# Patient Record
Sex: FEMALE | Race: WHITE
[De-identification: names, ages, dates, MRNs, and addresses within clinical notes are randomized per-mention and may not be internally consistent; named-entity substitution may affect disease eponyms.]

---

## 2017-01-07 ENCOUNTER — HOSPITAL ENCOUNTER (EMERGENCY)
Dept: HOSPITAL 45 - C.EDB | Age: 27
Discharge: HOME | End: 2017-01-07
Payer: COMMERCIAL

## 2017-01-07 VITALS
HEIGHT: 62.99 IN | BODY MASS INDEX: 30 KG/M2 | BODY MASS INDEX: 30 KG/M2 | WEIGHT: 169.32 LBS | HEIGHT: 62.99 IN | WEIGHT: 169.32 LBS

## 2017-01-07 VITALS
TEMPERATURE: 98.6 F | HEART RATE: 110 BPM | SYSTOLIC BLOOD PRESSURE: 118 MMHG | OXYGEN SATURATION: 95 % | DIASTOLIC BLOOD PRESSURE: 70 MMHG

## 2017-01-07 DIAGNOSIS — F17.200: ICD-10-CM

## 2017-01-07 DIAGNOSIS — R22.0: Primary | ICD-10-CM

## 2017-01-07 DIAGNOSIS — Z90.89: ICD-10-CM

## 2017-01-07 DIAGNOSIS — J45.909: ICD-10-CM

## 2017-01-07 DIAGNOSIS — Z98.818: ICD-10-CM

## 2017-01-07 DIAGNOSIS — Z87.01: ICD-10-CM

## 2017-01-07 DIAGNOSIS — Z82.49: ICD-10-CM

## 2017-01-07 NOTE — EMERGENCY ROOM VISIT NOTE
History


First contact with patient:  18:48


Chief Complaint:  FACIAL PAIN/INJURY


Stated Complaint:  SWOLLEN LIP





History of Present Illness


The patient is a 26 year old female who presents to the Emergency Room with 

complaints of swelling of the right side of her lower lip.  The patient reports 

that she has had a lip piercing in that area for several years.  She states 

that she is not able to wear the piercing at work, so she keeps it out but 

occasionally puts the jewelry in and then removes it to keep the piercing open.

  She states that she did this today with the same jewelry she has been using 

for several years.  She reports that a few hours afterward, she noticed 

swelling in that side of the lip.  She states that she has not had a reaction 

to the jewelry in the past.  She denies any redness or warmth of the lip.  She 

does report that she had one other episode of lip swelling several years ago.  

She reports that at that time, her friend's dog had licked her face and she 

noticed swelling in both of the lips several hours later.  She states that that 

swelling was in both of her lips and decreased over several hours.  She denies 

any swelling of her tongue, difficulty swallowing or difficulty breathing.  She 

denies any other new exposures.  She denies any pain.  The patient does not 

take any medications daily.





Review of Systems


A complete 10-point Review of Systems was discussed with the patient, with 

pertinent positives and negatives listed in the History of Present Illness. All 

remaining Review of Systems questions can be considered negative unless 

otherwise specified.





Past Medical/Surgical History


Medical Problems:


(1) Asthma


(2) PNA, asthma exac, tachy cardia


Surgical Problems:


(1) Hx of tonsillectomy


(2) [place] teeth extracted








Family History





FH: pulmonary embolism





Social History


Smoking Status:  Current Every Day Smoker


Alcohol Use:  occasionally


Drug Use:  none


Marital Status:  in relationship


Housing Status:  lives with friends


Occupation Status:  employed





Current/Historical Medications


Scheduled


Amoxicillin & Pot Clavulanate (Augmentin 875-125 mg), 1 TAB PO BID





Scheduled PRN


Albuterol Inhaler (Ventolin Inhaler), 2 PUFFS INH QID PRN for Wheezing


Albuterol Soln (Proventil 0.083% 2.5MG/3ML), 2.5 MG INH QID PRN for Wheezing





Allergies


Coded Allergies:  


     No Known Allergies (Verified , 1/7/17)





Physical Exam


Vital Signs











  Date Time  Temp Pulse Resp B/P Pulse Ox O2 Delivery O2 Flow Rate FiO2


 


1/7/17 20:43 37.0 110 18 118/70 95   


 


1/7/17 18:32 37.0 110 18 118/70 95 Room Air  











Physical Exam


VITALS: Vitals are noted on the nurse's note and reviewed by myself.  Vital 

signs stable.


GENERAL: This is a 26-year-old female, in no acute distress, nondiaphoretic, 

well-developed well-nourished.


HEAD: Normocephalic atraumatic.  


EARS: External auditory canals clear, tympanic membranes pearly gray without 

erythema or effusion bilaterally.


EYES: Pupils equal round and reactive to light and accommodation.  No 

periorbital swelling.


MOUTH: There is moderate edema of the right lower lip.  There is no erythema or 

warmth.  Mucous membranes moist.  Uvula midline.  Airway patent.


NECK: Supple without nuchal rigidity.  No lymphadenopathy. 


HEART: Regular rate and rhythm without murmurs gallops or rubs.


LUNGS: Clear to auscultation bilaterally without wheezes, rales or rhonchi. 


NEURO: Patient was alert and oriented to person place and time.





Medical Decision & Procedures


Medications Administered











 Medications


  (Trade)  Dose


 Ordered  Sig/Yanet


 Route  Start Time


 Stop Time Status Last Admin


Dose Admin


 


 Diphenhydramine


 HCl


  (Benadryl Inj)  25 mg  NOW  STAT


 IV  1/7/17 19:08


 1/7/17 19:09 DC 1/7/17 20:02


25 MG


 


 Dexamethasone


 Sodium Phosphate


 10 mg  10 mg  NOW  STAT


 IV  1/7/17 19:08


 1/7/17 19:09 DC 1/7/17 20:02


10 MG


 


 Famotidine/


 Dextrose


  (Pepcid IV Inj/


 D5 100ml)  102 ml @ 


 200 mls/hr  NOW  STAT


 IV  1/7/17 19:08


 1/7/17 19:38 DC 1/7/17 20:02


200 MLS/HR











Medical Decision


Differential diagnosis includes infection, allergic reaction, angioedema, among 

others.





The patient was evaluated as above.  The swelling does not appear to be 

consistent with infection.  It may be secondary to an allergic reaction, but 

the patient reports that she has been using the story for several years without 

difficulty.  I do question whether the patient could have angioedema, as she 

has had an episode of lip swelling in the past.  The patient does not have any 

airway involvement.  There is no swelling of the tongue.  She was given IV 

Benadryl, Pepcid and Decadron and did have a small amount of improvement in his 

symptoms.  She was encouraged to continue to use Benadryl at home.  I did give 

the patient a prescription for Augmentin to take in 2-3 days if her symptoms 

persist or if she develops any other findings suggestive of infection, such as 

redness or drainage.  She'll follow-up with her primary care provider.  She 

will return for worsening symptoms.  She verbalized understanding of my 

assessment and treatment plan and was discharged home in good condition.





Impression





 Primary Impression:  Lip swelling





Departure Information


Dispostion


Home / Self-Care





Condition


GOOD





Prescriptions





Amoxicillin & Pot Clavulanate (Augmentin 875-125 mg) 1 Tab Tab


1 TAB PO BID for 7 Days, #14 TAB


   Prov: Ni Wilder ., HERNESTO         1/7/17





Referrals


No Doctor, Assigned (PCP)





Patient Instructions


A Signature Page, My [facility]





Additional Instructions





You were prescribed Augmentin to be taken twice daily for 1 week. This is an 

antibiotic. All antibiotics have the potential to cause diarrhea. Stop this 

medication and contact a medical provider if you were to develop any 

significant adverse side effects including: wheezing, shortness of breath, 

passing out, vomiting, or a diffuse rash. Always take antibiotics as directed 

and COMPLETE the ENTIRE course regardless of the improvement of your symptoms.  

Take this antibiotic if you develop redness or warmth of the lip or if the 

swelling persists into the day tomorrow.





 You should take Benadryl (diphenhydramine) 50 mgs every 6 hours until symptoms 

resolve.  





Follow-up with your primary care provider regarding your lip swelling.





Return to the emergency department if you develop worsening swelling or any 

difficulty breathing or swallowing.

## 2017-02-16 ENCOUNTER — HOSPITAL ENCOUNTER (EMERGENCY)
Dept: HOSPITAL 45 - C.EDB | Age: 27
Discharge: HOME | End: 2017-02-16
Payer: COMMERCIAL

## 2017-02-16 VITALS
DIASTOLIC BLOOD PRESSURE: 59 MMHG | SYSTOLIC BLOOD PRESSURE: 113 MMHG | OXYGEN SATURATION: 96 % | TEMPERATURE: 99.68 F | HEART RATE: 96 BPM

## 2017-02-16 VITALS
WEIGHT: 160.5 LBS | WEIGHT: 160.5 LBS | HEIGHT: 62.99 IN | BODY MASS INDEX: 28.44 KG/M2 | HEIGHT: 62.99 IN | BODY MASS INDEX: 28.44 KG/M2

## 2017-02-16 DIAGNOSIS — J02.9: Primary | ICD-10-CM

## 2017-02-16 DIAGNOSIS — J45.909: ICD-10-CM

## 2017-02-16 LAB
ANION GAP SERPL CALC-SCNC: 11 MMOL/L (ref 3–11)
BASOPHILS # BLD: 0.03 K/UL (ref 0–0.2)
BASOPHILS NFR BLD: 0.3 %
BUN SERPL-MCNC: 12 MG/DL (ref 7–18)
BUN/CREAT SERPL: 12.9 (ref 10–20)
CALCIUM SERPL-MCNC: 8.5 MG/DL (ref 8.5–10.1)
CHLORIDE SERPL-SCNC: 110 MMOL/L (ref 98–107)
CO2 SERPL-SCNC: 21 MMOL/L (ref 21–32)
COMPLETE: YES
CREAT CL PREDICTED SERPL C-G-VRATE: 88.4 ML/MIN
CREAT SERPL-MCNC: 0.93 MG/DL (ref 0.6–1.2)
EOSINOPHIL NFR BLD AUTO: 169 K/UL (ref 130–400)
GLUCOSE SERPL-MCNC: 85 MG/DL (ref 70–99)
HCT VFR BLD CALC: 41.1 % (ref 37–47)
IG%: 0.2 %
IMM GRANULOCYTES NFR BLD AUTO: 22.9 %
LYMPHOCYTES # BLD: 2.5 K/UL (ref 1.2–3.4)
MCH RBC QN AUTO: 33.3 PG (ref 25–34)
MCHC RBC AUTO-ENTMCNC: 35.5 G/DL (ref 32–36)
MCV RBC AUTO: 93.8 FL (ref 80–100)
MONOCYTES NFR BLD: 8.3 %
NEUTROPHILS # BLD AUTO: 1.9 %
NEUTROPHILS NFR BLD AUTO: 66.4 %
PMV BLD AUTO: 10.4 FL (ref 7.4–10.4)
POTASSIUM SERPL-SCNC: 3.5 MMOL/L (ref 3.5–5.1)
RBC # BLD AUTO: 4.38 M/UL (ref 4.2–5.4)
SODIUM SERPL-SCNC: 142 MMOL/L (ref 136–145)
WBC # BLD AUTO: 10.92 K/UL (ref 4.8–10.8)

## 2017-02-16 NOTE — EMERGENCY ROOM VISIT NOTE
History


Report prepared by Vasu:  Farzaneh Begum


Under the Supervision of:  Dr. Ji Kat M.D.


First contact with patient:  21:27


Chief Complaint:  ILLNESS


Stated Complaint:  THROAT AND R SIDE OF NECK HURT, TROUBLE SWALLOWING





History of Present Illness


The patient is a 26 year old female who presents to the Emergency Room with 

complaints of worsening right sided throat pain starting earlier today PTA. The 

patient currently rates her pain as a 9/10 in severity. The patient state 

states that she is unable to swallow very well due to the throat pain. She 

states that she also has neck pain and ear pain associated with her symptoms. 

The patient states that she has also had a fever up to 102 degrees Fahrenheit 

and chills today. The patient denies any sick contacts, any urinary symptoms, 

abdominal pain or problems having bowel movements.The patient states that she 

has not taken any medication for her symptoms today.





   Source of History:  patient


   Onset:  earlier today PTA


   Position:  throat (right)


   Symptom Intensity:  9/10


   Timing:  worsening


   Associated Symptoms:  + chills, + fevers (102), No abdominal pain, No 

urinary symptoms


Note:


Patient denies changes in bowel movements.





Review of Systems


All systems have been listed, reviewed, and are negative other than those 

previously mentioned. Please see Additional Medical History Sheet.





Past Medical & Surgical


Medical Problems:


(1) Asthma


(2) PNA, asthma exac, tachy cardia


Surgical Problems:


(1) Hx of tonsillectomy


(2) [place] teeth extracted








Family History





FH: pulmonary embolism





Social History


Smoking Status:  Never Smoker


Alcohol Use:  occasionally


Drug Use:  none


Marital Status:  in relationship


Housing Status:  lives with friends


Occupation Status:  employed





Current/Historical Medications


Scheduled PRN


Albuterol Inhaler (Ventolin Inhaler), 2 PUFFS INH QID PRN for Wheezing


Albuterol Soln (Proventil 0.083% 2.5MG/3ML), 2.5 MG INH QID PRN for Wheezing





Allergies


Coded Allergies:  


     No Known Allergies (Verified , 1/7/17)





Physical Exam


Vital Signs











  Date Time  Temp Pulse Resp B/P Pulse Ox O2 Delivery O2 Flow Rate FiO2


 


2/16/17 22:57 37.6 96 16 113/59 96 Room Air  


 


2/16/17 20:48 37.7 102 18 120/70 98 Room Air  











Physical Exam


GENERAL: Patient awake, alert, oriented x 3.  Patient follows commands.  

Patient appears uncomfortable in moderate distress and pale. 


SKIN: No erythema, cyanosis or rash


HEENT: Normal head, pupils equal, reactive to light and accommodation.  Ears 

normal.  Oral cavity and slight erythema of the posterior oropharynx, no pus, 

no tonsillar hypertrophy.  Neck: right sided submandibular adenopathy, no neck 

vein distention, no meningismus signs. 


LUNGS: Clear to auscultation.  No wheezes, no rales, no rhonchi.


HEART:  No murmurs. No gallops. No rubs


ABDOMEN: No masses, no rebound, no hepatomegaly or splenomegaly.


EXTREMITIES: No signs of trauma.  No pedal or pretibial edema.  No calf or 

thigh tenderness.


NEUROLOGIC: Cranial nerves II-XII within normal limits.  No gross motor sensory 

function deficits.





Medical Decision & Procedures


Laboratory Results


2/16/17 21:40








Red Blood Count 4.38, Mean Corpuscular Volume 93.8, Mean Corpuscular Hemoglobin 

33.3, Mean Corpuscular Hemoglobin Concent 35.5, Mean Platelet Volume 10.4, 

Neutrophils (%) (Auto) 66.4, Lymphocytes (%) (Auto) 22.9, Monocytes (%) (Auto) 

8.3, Eosinophils (%) (Auto) 1.9, Basophils (%) (Auto) 0.3, Neutrophils # (Auto) 

7.25, Lymphocytes # (Auto) 2.50, Monocytes # (Auto) 0.91, Eosinophils # (Auto) 

0.21, Basophils # (Auto) 0.03





2/16/17 21:40

















Test


  2/16/17


21:35 2/16/17


21:40


 


Influenza Type A Antigen


  Neg for Influ


A (NEG) 


 


 


Influenza Type B Antigen


  Neg for Influ


B (NEG) 


 


 


White Blood Count


  


  10.92 K/uL


(4.8-10.8)


 


Red Blood Count


  


  4.38 M/uL


(4.2-5.4)


 


Hemoglobin


  


  14.6 g/dL


(12.0-16.0)


 


Hematocrit  41.1 % (37-47) 


 


Mean Corpuscular Volume


  


  93.8 fL


()


 


Mean Corpuscular Hemoglobin


  


  33.3 pg


(25-34)


 


Mean Corpuscular Hemoglobin


Concent 


  35.5 g/dl


(32-36)


 


Platelet Count


  


  169 K/uL


(130-400)


 


Mean Platelet Volume


  


  10.4 fL


(7.4-10.4)


 


Neutrophils (%) (Auto)  66.4 % 


 


Lymphocytes (%) (Auto)  22.9 % 


 


Monocytes (%) (Auto)  8.3 % 


 


Eosinophils (%) (Auto)  1.9 % 


 


Basophils (%) (Auto)  0.3 % 


 


Neutrophils # (Auto)


  


  7.25 K/uL


(1.4-6.5)


 


Lymphocytes # (Auto)


  


  2.50 K/uL


(1.2-3.4)


 


Monocytes # (Auto)


  


  0.91 K/uL


(0.11-0.59)


 


Eosinophils # (Auto)


  


  0.21 K/uL


(0-0.5)


 


Basophils # (Auto)


  


  0.03 K/uL


(0-0.2)


 


RDW Standard Deviation


  


  44.5 fL


(36.4-46.3)


 


RDW Coefficient of Variation


  


  12.8 %


(11.5-14.5)


 


Immature Granulocyte % (Auto)  0.2 % 


 


Immature Granulocyte # (Auto)


  


  0.02 K/uL


(0.00-0.02)


 


Anion Gap


  


  11.0 mmol/L


(3-11)


 


Est Creatinine Clear Calc


Drug Dose 


  88.4 ml/min 


 


 


Estimated GFR (


American) 


  99.0 


 


 


Estimated GFR (Non-


American 


  85.4 


 


 


BUN/Creatinine Ratio  12.9 (10-20) 


 


Calcium Level


  


  8.5 mg/dl


(8.5-10.1)





Laboratory results as stated above per my review.





Medications Administered











 Medications


  (Trade)  Dose


 Ordered  Sig/Yanet


 Route  Start Time


 Stop Time Status Last Admin


Dose Admin


 


 Acetaminophen


 1000 mg  1,000 mg  NOW  STAT


 PO  2/16/17 21:28


 2/16/17 21:34 DC 2/16/17 21:45


1,000 MG


 


 Sodium Chloride


  (Nss 1000ml)  1,000 ml @ 


 1,000 mls/hr  Q1H ONCE


 IV  2/16/17 21:30


 2/16/17 22:29 DC 2/16/17 21:46


1,000 MLS/HR


 


 Lidocaine HCl


  (Viscous


 Lidocaine 2% Soln)  20 ml  NOW  ONCE


 MT  2/16/17 21:30


 2/16/17 21:34 DC 2/16/17 21:44


20 ML











ED Course


2127: Past medical records reviewed. The patient was evaluated in room C10. A 

complete history and physical examination was performed. 





2128: Ordered Tylenol Tab 1,000 mg PO.





2130: Ordered  Lidocaine HCl 20 ml MT, Sodium Chloride 1,000 ml @ 1,000 mls/hr 

IV.





2305: Upon reevaluation, the patient appeared to have improvement of her 

symptoms. I discussed today's findings with her. She verbalized agreement of 

the treatment plan. The patient was discharged home.





Medical Decision


Nurses notes reviewed. Medical history sheet reviewed. Differential diagnosis 

includes but is not limited to: influenza, pharyngitis viral vs bacteria, other 

viral infections, and dehydration. 





Labs were obtained.  Please see above.  Influenza testing was negative.  White 

count is minimally elevated.  Strep test was negative.  The patient was given a 

liter of IV fluids and felt significantly better.  She will continue taking 

Tylenol at home.  She is to gargle with salt water.  She is to drink extra 

fluids.





Impression





 Primary Impression:  


 Acute pharyngitis





Scribe Attestation


The scribe's documentation has been prepared under my direction and personally 

reviewed by me in its entirety. I confirm that the note above accurately 

reflects all work, treatment, procedures, and medical decision making performed 

by me.





Departure Information


Dispostion


Home / Self-Care





Referrals


No Doctor, Assigned (PCP)





Forms


HOME CARE DOCUMENTATION FORM,                                                 

               IMPORTANT VISIT INFORMATION, WORK / SCHOOL INSTRUCTIONS





Patient Instructions


My [facility], Sore Throat - [facility]





Additional Instructions





Drink 3-4 quarts of liquid of the next 24 hours.


650 mg of Tylenol every 4 hours as needed for aches, pain or fever.


Gargle with hot salt water   1 tablespoon of salt in a half a glass of hot 

water.


Off work until 2/20/17.


REST

## 2017-07-22 ENCOUNTER — HOSPITAL ENCOUNTER (EMERGENCY)
Dept: HOSPITAL 45 - C.EDB | Age: 27
Discharge: HOME | End: 2017-07-22
Payer: COMMERCIAL

## 2017-07-22 VITALS — SYSTOLIC BLOOD PRESSURE: 113 MMHG | DIASTOLIC BLOOD PRESSURE: 72 MMHG | OXYGEN SATURATION: 96 % | HEART RATE: 88 BPM

## 2017-07-22 VITALS
WEIGHT: 161.16 LBS | BODY MASS INDEX: 28.55 KG/M2 | BODY MASS INDEX: 28.55 KG/M2 | WEIGHT: 161.16 LBS | HEIGHT: 62.99 IN | HEIGHT: 62.99 IN

## 2017-07-22 VITALS — TEMPERATURE: 97.88 F

## 2017-07-22 DIAGNOSIS — F17.200: ICD-10-CM

## 2017-07-22 DIAGNOSIS — J45.909: ICD-10-CM

## 2017-07-22 DIAGNOSIS — J18.9: Primary | ICD-10-CM

## 2017-07-22 NOTE — EMERGENCY ROOM VISIT NOTE
History


First contact with patient:  11:40


Chief Complaint:  CONGESTION


Stated Complaint:  CHEST LULY., STUFFY NOSE


Nursing Triage Summary:  


Pt c/o chest and nasal congestion x4 days





unable to cough anything up





History of Present Illness


The patient is a 26 year old female who presents to the Emergency Room via 

private vehicle with complaints of "chest and nasal congestion 4 days".  The 

patient states that beginning 4 days ago, she felt as though she had mucus in 

her chest, and is tried Mucinex but is unable to cough or break any of it up.  

She also notes a runny nose.  She states that there are small little crystals 

in her saliva.  She denies any fevers, chills.  She does have history of 

smoking.  She denies any hormone use.  She denies any history of PE.





Review of Systems


A complete 6-point Review of Systems was discussed with the patient, with 

pertinent positives and negatives listed in the History of Present Illness. All 

remaining Review of Systems questions can be considered negative unless 

otherwise specified.





Past Medical/Surgical History


Medical Problems:


(1) Asthma


(2) PNA, asthma exac, tachy cardia


Surgical Problems:


(1) Hx of tonsillectomy


(2) [place] teeth extracted








Family History





FH: pulmonary embolism





Social History


Smoking Status:  Current Every Day Smoker


Alcohol Use:  occasionally


Drug Use:  none


Marital Status:  in relationship


Housing Status:  lives with friends


Occupation Status:  employed





Current/Historical Medications


Scheduled


Albuterol Hfa (Ventolin Hfa), 2-4 PUFFS INH Q6H


Levofloxacin (Levaquin), 1 TAB PO DAILY





Scheduled PRN


Albuterol Sulf (Proventil 0.083% 2.5MG/3ML), 2.5 MG INH QID PRN for Wheezing





Physical Exam


Vital Signs











  Date Time  Temp Pulse Resp B/P (MAP) Pulse Ox O2 Delivery O2 Flow Rate FiO2


 


7/22/17 13:04  88  113/72 96 Room Air  


 


7/22/17 11:57      Room Air  


 


7/22/17 11:37 36.6 103 18 142/55 95 Room Air  











Physical Exam


VITAL SIGNS - Vital signs and nursing notes were reviewed.  Patient is afebrile

, hypertensive at 142/55, tachycardic rate of 103 bpm, and is saturating on 

room air 95%.


GENERAL -26-year-old female appearing her stated age who is in no acute 

distress. Communicates well with provider and answers questions appropriately.


SKIN - Without rashes.  No petechial rashes.


HEAD - NC/AT.


EYES - PERRL with EOMI bilaterally. Sclera anicteric. 


EARS - No deformities of external structures noted on gross examination 

bilaterally. No pain elicited with palpation of the tragus bilaterally. 

External auditory canals without discharge or otorrhea. Tympanic membranes 

pearly gray without retraction or bulging. No fluid or purulent material 

visualized behind the TM. Handle of malleus, umbo, cone of light, pars tensa/

flaccid all easily visualized.


NOSE - Midline and without cyanosis. No epistaxis or purulent drainage noted. 

Septum midline without deviation or septal hematoma noted.


MOUTH/OROPHARYNX - Without perioral cyanosis. Buccal mucosa pink and moist and 

without leukoplakia. Tongue midline with equal elevation of palate bilaterally. 

No tonsillar hypertrophy, erythema, or exudates noted.  Fair dentition noted.  

Small crystallization noted in the region where her tonsils removed.


NECK - Neck with FROM. Supple to palpation.  No lymphadenopathy noted. No 

nuchal rigidity.


LUNGS - Chest wall symmetric without accessory muscle use, intercostals 

retractions, or central cyanosis.  There is diffuse bilateral wheezing.


CARDIAC - RRR with S1/S2. No murmur, rubs, or gallops appreciated.





Medical Decision & Procedures


ER Provider


Diagnostic Interpretation:


CHEST 2 VIEWS ROUTINE





CLINICAL HISTORY: Cough, chest congestion.    





COMPARISON STUDY:  Chest radiograph November 4, 2016.





FINDINGS: There is no pneumothorax or pleural effusion. There is mild lower lung


interstitial thickening there may be lingular and right middle lobe opacities.


Cardiac size is normal. Mediastinal contours are normal. There is no evidence


for pulmonary edema. 





IMPRESSION: Mild lower lung interstitial thickening with patchy airspace


opacities which favors an infectious process.














Electronically signed by:  Samson rCuz M.D.


7/22/2017 12:30 PM





Dictated Date/Time:  7/22/2017 12:29 PM





Medications Administered











 Medications


  (Trade)  Dose


 Ordered  Sig/Yanet


 Route  Start Time


 Stop Time Status Last Admin


Dose Admin


 


 Albuterol/


 Ipratropium


  (Duoneb)  3 ml  NOW  STAT


 INH  7/22/17 11:51


 7/22/17 11:53 DC 7/22/17 12:16


3 ML


 


 Levofloxacin


  (Levaquin Tab)  750 mg  STK-MED ONCE


 .ROUTE  7/22/17 13:01


 7/22/17 13:02 DC 7/22/17 13:06


750 MG











Medical Decision


Patient was seen and evaluated as above.  After obtaining a thorough history 

and physical examination chest x-ray was obtained as well as a breathing 

treatment via DuoNeb.  Patient denied chance of pregnancy, and noted that she 

was currently menstruating.  She is nontoxic in appearance, and has bilateral 

wheezing to auscultation.  She was reevaluated post-breathing treatment and was 

not experiencing any relief.  She is able to breathe well on her own, she just 

has some wheezing.  X-rays concerning for that of an infectious process, which 

I believe to be pneumonia.  Given that she is afebrile, and has no complaints 

of fevers, chills I believe that outpatient management for pneumonia may be 

performed.  Initially I was going to start her on azithromycin, however she 

notes that this does not work for her.  Levaquin will be initiated.  750 mg 7 

days.  First dose was given here today over concern that she could not  

her prescription today at the pharmacy.  I discussed with her that although 

less likely, pulmonary embolism is a differential diagnosis.  She is tachycardic

, but is saturating well on room air.  She does smoke, but has no history of 

pulmonary embolism, and does not have any concerning pleuritic pain.  The 

patient is experiencing congestion, as well as a runny nose, and with these 

occurring concurrently I believe infectious etiology is favored over embolism.  

The patient is to follow up regarding today's visit, and certainly is to return 

if worsening.  She was educated upon worrisome symptoms which to return, had 

questions prior to discharge, and was discharged home in good condition.





In evaluation treatment this patient following differential diagnoses were 

entertained: Pneumonia, bronchitis, pulmonary embolism, among others.





Impression





 Primary Impression:  


 Pneumonia





Departure Information


Dispostion


Home / Self-Care





Condition


GOOD





Prescriptions





Levofloxacin (LEVAQUIN) 750 Mg Tab


1 TAB PO DAILY for 6 Days, #6 TAB


   Prov: Manoj Ordaz PA-C         7/22/17





Referrals


Lakeisha Terry C.R.NBAMBI (PCP)





Patient Instructions


My [facility]





Additional Instructions





You were seen in the emergency department for chest congestion, stuffy nose.





Continuing and Mucinex as indicated on the package is recommended.





You have been prescribed Levaquin, this is 750 mg daily for the next 7 days.  

You have been given your first dose here with the remainder the prescription 

for the remaining 6 days.  You may begin this tomorrow.





Please use Tylenol and ibuprofen for any complaints of pain.





Please return with any worsening of your symptoms, fevers, chills or any 

concerns.





Thank you for your time.

## 2017-07-22 NOTE — DIAGNOSTIC IMAGING REPORT
CHEST 2 VIEWS ROUTINE



CLINICAL HISTORY: Cough, chest congestion.    



COMPARISON STUDY:  Chest radiograph November 4, 2016.



FINDINGS: There is no pneumothorax or pleural effusion. There is mild lower lung

interstitial thickening there may be lingular and right middle lobe opacities.

Cardiac size is normal. Mediastinal contours are normal. There is no evidence

for pulmonary edema. 



IMPRESSION: Mild lower lung interstitial thickening with patchy airspace

opacities which favors an infectious process.









Electronically signed by:  Samson Cruz M.D.

7/22/2017 12:30 PM



Dictated Date/Time:  7/22/2017 12:29 PM

## 2017-07-25 ENCOUNTER — HOSPITAL ENCOUNTER (OUTPATIENT)
Dept: HOSPITAL 45 - C.LABSPEC | Age: 27
Discharge: HOME | End: 2017-07-25
Attending: OBSTETRICS & GYNECOLOGY
Payer: COMMERCIAL

## 2017-07-25 ENCOUNTER — HOSPITAL ENCOUNTER (OUTPATIENT)
Dept: HOSPITAL 45 - C.PAPS | Age: 27
End: 2017-07-25
Attending: OBSTETRICS & GYNECOLOGY
Payer: COMMERCIAL

## 2017-07-25 DIAGNOSIS — Z01.419: Primary | ICD-10-CM

## 2017-07-25 DIAGNOSIS — Z11.51: ICD-10-CM

## 2017-07-28 LAB
CHLAMYDIA TRACH RNA***: NOT DETECTED
GC (NEIS GONORRHOEAE)RNA**: NOT DETECTED

## 2017-10-14 ENCOUNTER — HOSPITAL ENCOUNTER (EMERGENCY)
Dept: HOSPITAL 45 - C.EDB | Age: 27
Discharge: HOME | End: 2017-10-14
Payer: COMMERCIAL

## 2017-10-14 VITALS
HEIGHT: 62.99 IN | WEIGHT: 169.54 LBS | WEIGHT: 169.54 LBS | BODY MASS INDEX: 30.04 KG/M2 | HEIGHT: 62.99 IN | BODY MASS INDEX: 30.04 KG/M2

## 2017-10-14 VITALS
TEMPERATURE: 99.14 F | OXYGEN SATURATION: 96 % | DIASTOLIC BLOOD PRESSURE: 73 MMHG | SYSTOLIC BLOOD PRESSURE: 133 MMHG | HEART RATE: 92 BPM

## 2017-10-14 DIAGNOSIS — Z98.818: ICD-10-CM

## 2017-10-14 DIAGNOSIS — F17.200: ICD-10-CM

## 2017-10-14 DIAGNOSIS — Z90.89: ICD-10-CM

## 2017-10-14 DIAGNOSIS — Z87.01: ICD-10-CM

## 2017-10-14 DIAGNOSIS — Z82.49: ICD-10-CM

## 2017-10-14 DIAGNOSIS — J45.901: Primary | ICD-10-CM

## 2017-10-14 NOTE — DIAGNOSTIC IMAGING REPORT
SINGLE VIEW CHEST



CLINICAL HISTORY:  Atypical chest pain.



FINDINGS: An AP, portable, upright chest radiograph is compared to study dated

7/22/2017 and correlated with chest CT dated 1/15/2016. The cardiomediastinal

silhouette is unremarkable. The lungs and pleural spaces are clear. No

pneumothorax is seen. The bony thorax is grossly intact.



IMPRESSION: No active disease in the chest.







Electronically signed by:  Mani Mcfadden M.D.

10/14/2017 7:24 PM



Dictated Date/Time:  10/14/2017 7:23 PM

## 2017-10-14 NOTE — EMERGENCY ROOM VISIT NOTE
History


Report prepared by Vasu:  Florence Bain


Under the Supervision of:  Dr. Tom Bah M.D.


First contact with patient:  18:57


Chief Complaint:  COUGH


Stated Complaint:  CANT BREATH,CHEST PAIN





History of Present Illness


The patient is a 27 year old female who presents to the Emergency Room with 

complaints of worsening cough starting 2-3 days ago. Her cough worsened last 

night. She feels unable to breathe and has chest pain with breathing and 

coughing. She has been using her nebulizer at home to no significant relief. 

Her cough is not productive. She feels wheezy. She denies any fever, leg pain, 

leg swelling, sore throat, rash, abdominal pain, post nasal drip, nausea, 

vomiting, diarrhea, or ear ache. She states her symptoms feel like asthma and 

bronchitis. She has a history of asthma, bronchitis, and pneumonia. She uses a 

Ventolin inhaler as needed. She is currently not on prednisone, but has had it 

in the past. She denies any chance of pregnancy. She works in a  and she 

has had sick contacts at work.





   Source of History:  patient


   Onset:  2-3 days ago


   Position:  other (global)


   Quality:  other (cough)


   Timing:  worsening


   Associated Symptoms:  + chest pain, + SOB, No fevers, No sorethroat, No 

nausea, No vomiting, No abdominal pain, No diarrhea, No rash


Note:


Pt denies leg pain/swelling, post nasal drip, ear ache.





Review of Systems


See HPI for pertinent positives & negatives. A total of 10 systems reviewed and 

were otherwise negative.





Past Medical & Surgical


Medical Problems:


(1) Asthma


(2) PNA, asthma exac, tachy cardia


Surgical Problems:


(1) Hx of tonsillectomy


(2) [place] teeth extracted





Old medical records were reviewed. Nurse's notes were reviewed and I agree with.





Family History





FH: pulmonary embolism





Social History


Smoking Status:  Current Every Day Smoker


Alcohol Use:  occasionally


Drug Use:  none


Marital Status:  in relationship


Housing Status:  lives with friends


Occupation Status:  employed





Current/Historical Medications


Scheduled


Albuterol Hfa (Ventolin Hfa), 2-4 PUFFS INH Q6H


Amoxicillin & Pot Clavulanate (Augmentin 875-125 mg), 875 MG PO BID


Prednisone (Prednisone), 50 MG PO DAILY





Scheduled PRN


Albuterol Sulf (Proventil 0.083% 2.5MG/3ML), 2.5 MG INH QID PRN for Wheezing





Allergies


Coded Allergies:  


     No Known Allergies (Verified , 10/14/17)





Physical Exam


Vital Signs











  Date Time  Temp Pulse Resp B/P (MAP) Pulse Ox O2 Delivery O2 Flow Rate FiO2


 


10/14/17 20:25 37.3 92  133/73 96   


 


10/14/17 19:13      Room Air  


 


10/14/17 18:54 37.5 92 19 111/64 97 Room Air  











Physical Exam


General: Non-ill appearing young female in no acute distress. 


HEENT: Normal cephalic atraumatic.  Pupils are equal round and reactive to 

light.  Extraocular movements are intact. Normal tympanic membranes. Oropharynx 

is pink with moist mucous membranes.  No swelling of the mouth lips or tongue.


Neck: Supple with a midline trachea.  No meningeal signs or stiffness, no JVD 

or bruits. No Stridor.


Chest: Scattered wheezes, normal respiratory effort, deep hacking cough when 

she takes a deep breath. 


Heart: regular rate and rhythm. 


Abdomen: Soft nontender, nondistended without rebound guarding or rigidity.  


Extremities: No cyanosis clubbing or edema. No calf tenderness or assymetry


Spine/Back. Non tender to palpation. No CVA tenderness


Skin: Good turgor without rashes.


Neurologic exam: Cranial nerves two through 12 are intact.  Motor and sensation 

are intact and symmetrical throughout.





Medical Decision & Procedures


ER Provider


Diagnostic Interpretation:


X-ray results as stated below per interpretation by me and the radiologist: 





SINGLE VIEW CHEST





CLINICAL HISTORY:  Atypical chest pain.





FINDINGS: An AP, portable, upright chest radiograph is compared to study dated


7/22/2017 and correlated with chest CT dated 1/15/2016. The cardiomediastinal


silhouette is unremarkable. The lungs and pleural spaces are clear. No


pneumothorax is seen. The bony thorax is grossly intact.





IMPRESSION: No active disease in the chest.





Electronically signed by:  Mani Mcfadden M.D.


10/14/2017 7:24 PM





Dictated Date/Time:  10/14/2017 7:23 PM





Medications Administered











 Medications


  (Trade)  Dose


 Ordered  Sig/Yanet


 Route  Start Time


 Stop Time Status Last Admin


Dose Admin


 


 Albuterol/


 Ipratropium


  (Duoneb)  3 ml  ONE  STAT


 INH  10/14/17 19:05


 10/14/17 19:08 DC 10/14/17 19:17


3 ML


 


 Prednisone


  (PredniSONE TAB)  60 mg  NOW  STAT


 PO  10/14/17 20:03


 10/14/17 20:04 DC 10/14/17 20:22


60 MG


 


 Amoxicillin/


 Clavulanate


 Potassium


  (Augmentin Tab)  875 mg  BID  ONCE


 PO  10/14/17 21:00


 10/14/17 21:01 DC 10/14/17 20:22


875 MG











ED Course


1859: Past medical records reviewed. The patient was evaluated in room B2, and 

a complete history and physical examination were performed.





1905: Duoneb 3 ml INH.





2001: Upon reevaluation, the patient is feeling better. I discussed the results 

and treatment plan with her. She verbalized agreement of the treatment plan. 

The patient was discharged home. 





2003: Prednisone 60 mg PO.





2100: Augmentin Tab 875 mg PO.





Medical Decision


Differentials include, but are not limited to; bronchitis, pneumonia, viral 

illness, asthma exacerbation, cardiac disease, PE. 





This patient comes in as described above.  She's had a cough for few days .  

She does have asthma.  On exam, she does have some scattered wheezing but has 

no increased work of breathing and is non-hypoxemic.  I did give her albuterol 

Atrovent neb and she seems to be doing much better.  She is resting comfortably 

and sleeping.  Chest x-ray was unremarkable.  She's been coughing with a dry 

hacking cough.  No trauma.  She has nothing to suggest influenza.  I think this 

is bronchitis.  Given her underlying asthma, I will put her on prednisone.  She 

was given 60 mg by mouth here and a prescription for 50 for the next 4 days.  

She should continue using albuterol inhaler.  She is also given antibiotic 

coverage with Augmentin 875 mg twice a day for 10 days.  She is encouraged to 

follow-up with her regular doctor this coming week and return if: Fever or 

chills, worsening of symptoms, any new problems concerns.  She was happy with 

plan and discharged to home.





Medication Reconcilliation


Current Medication List:  was personally reviewed by me





Blood Pressure Screening


Patient's blood pressure:  Normal blood pressure


Blood pressure disposition:  Did not require urgent referral





Impression





 Primary Impression:  


 Bronchitis


 Additional Impression:  


 Asthma exacerbation





Scribe Attestation


The scribe's documentation has been prepared under my direction and personally 

reviewed by me in its entirety. I confirm that the note above accurately 

reflects all work, treatment, procedures, and medical decision making performed 

by me.





Departure Information


Dispostion


Home / Self-Care





Prescriptions





Prednisone (Prednisone) 50 Mg Tab


50 MG PO DAILY, #4 TAB


   Prov: Tom Bah M.D.         10/14/17 


Amoxicillin & Pot Clavulanate (Augmentin 875-125 mg) 1 Tab Tab


875 MG PO BID for 10 Days, #20 TAB


   Prov: Tom Bah M.D.         10/14/17





Referrals


Lakeisha Terry C.RMadonnaN.P





Forms


HOME CARE DOCUMENTATION FORM,                                                 

               IMPORTANT VISIT INFORMATION





Patient Instructions


My [facility]





Additional Instructions





Rest.


Drink plenty of fluids.





Use Augmentin 875 milligrams twice a day for 10 days





Use prednisone 50 mg a day for the next 4 days





Continue your inhaler





Return if: Worsening of symptoms, shortness of breath, any new problems or 

concerns.





Follow up with your doctor Monday for recheck





Problem Qualifiers

## 2017-12-01 ENCOUNTER — HOSPITAL ENCOUNTER (INPATIENT)
Dept: HOSPITAL 45 - C.EDB | Age: 27
LOS: 2 days | Discharge: LEFT BEFORE DISCHARGE | DRG: 203 | End: 2017-12-03
Attending: HOSPITALIST | Admitting: HOSPITALIST
Payer: COMMERCIAL

## 2017-12-01 VITALS — OXYGEN SATURATION: 93 % | HEART RATE: 117 BPM

## 2017-12-01 VITALS
TEMPERATURE: 98.06 F | DIASTOLIC BLOOD PRESSURE: 92 MMHG | OXYGEN SATURATION: 93 % | HEART RATE: 115 BPM | SYSTOLIC BLOOD PRESSURE: 117 MMHG

## 2017-12-01 VITALS
SYSTOLIC BLOOD PRESSURE: 118 MMHG | DIASTOLIC BLOOD PRESSURE: 66 MMHG | OXYGEN SATURATION: 93 % | HEART RATE: 120 BPM | TEMPERATURE: 98.78 F

## 2017-12-01 VITALS
HEART RATE: 98 BPM | DIASTOLIC BLOOD PRESSURE: 73 MMHG | OXYGEN SATURATION: 90 % | TEMPERATURE: 98.6 F | SYSTOLIC BLOOD PRESSURE: 120 MMHG

## 2017-12-01 VITALS — OXYGEN SATURATION: 93 % | HEART RATE: 107 BPM

## 2017-12-01 VITALS
WEIGHT: 162.7 LBS | HEIGHT: 62.99 IN | WEIGHT: 162.7 LBS | BODY MASS INDEX: 28.83 KG/M2 | HEIGHT: 62.99 IN | BODY MASS INDEX: 28.83 KG/M2

## 2017-12-01 VITALS
TEMPERATURE: 98.24 F | SYSTOLIC BLOOD PRESSURE: 111 MMHG | DIASTOLIC BLOOD PRESSURE: 62 MMHG | OXYGEN SATURATION: 95 % | HEART RATE: 98 BPM

## 2017-12-01 VITALS — HEART RATE: 99 BPM | OXYGEN SATURATION: 99 %

## 2017-12-01 VITALS — OXYGEN SATURATION: 90 %

## 2017-12-01 VITALS — OXYGEN SATURATION: 98 % | HEART RATE: 111 BPM

## 2017-12-01 VITALS — HEART RATE: 90 BPM | OXYGEN SATURATION: 94 %

## 2017-12-01 DIAGNOSIS — F17.210: ICD-10-CM

## 2017-12-01 DIAGNOSIS — J02.9: ICD-10-CM

## 2017-12-01 DIAGNOSIS — J45.51: Primary | ICD-10-CM

## 2017-12-01 DIAGNOSIS — J30.9: ICD-10-CM

## 2017-12-01 DIAGNOSIS — Z91.19: ICD-10-CM

## 2017-12-01 LAB
ANION GAP SERPL CALC-SCNC: 9 MMOL/L (ref 3–11)
BASOPHILS # BLD: 0.05 K/UL (ref 0–0.2)
BASOPHILS NFR BLD: 0.6 %
BUN SERPL-MCNC: 9 MG/DL (ref 7–18)
BUN/CREAT SERPL: 10.2 (ref 10–20)
CALCIUM SERPL-MCNC: 9 MG/DL (ref 8.5–10.1)
CHLORIDE SERPL-SCNC: 105 MMOL/L (ref 98–107)
CO2 SERPL-SCNC: 25 MMOL/L (ref 21–32)
COMPLETE: YES
CREAT CL PREDICTED SERPL C-G-VRATE: 92.9 ML/MIN
CREAT SERPL-MCNC: 0.89 MG/DL (ref 0.6–1.2)
EOSINOPHIL NFR BLD AUTO: 238 K/UL (ref 130–400)
GLUCOSE SERPL-MCNC: 83 MG/DL (ref 70–99)
HCT VFR BLD CALC: 45 % (ref 37–47)
IG%: 0.3 %
IMM GRANULOCYTES NFR BLD AUTO: 24.3 %
INR PPP: 1 (ref 0.9–1.1)
LYMPHOCYTES # BLD: 2.16 K/UL (ref 1.2–3.4)
MCH RBC QN AUTO: 32.8 PG (ref 25–34)
MCHC RBC AUTO-ENTMCNC: 34.4 G/DL (ref 32–36)
MCV RBC AUTO: 95.3 FL (ref 80–100)
MONOCYTES NFR BLD: 7 %
NEUTROPHILS # BLD AUTO: 8.5 %
NEUTROPHILS NFR BLD AUTO: 59.3 %
PMV BLD AUTO: 9.5 FL (ref 7.4–10.4)
POTASSIUM SERPL-SCNC: 3.7 MMOL/L (ref 3.5–5.1)
PROTHROMBIN TIME: 10.7 SECONDS (ref 9–12)
RBC # BLD AUTO: 4.72 M/UL (ref 4.2–5.4)
SODIUM SERPL-SCNC: 139 MMOL/L (ref 136–145)
WBC # BLD AUTO: 8.9 K/UL (ref 4.8–10.8)

## 2017-12-01 RX ADMIN — ALBUTEROL SULFATE SCH MG: 2.5 SOLUTION RESPIRATORY (INHALATION) at 14:30

## 2017-12-01 RX ADMIN — METHYLPREDNISOLONE SODIUM SUCCINATE SCH MLS/MIN: 1 INJECTION, POWDER, FOR SOLUTION INTRAMUSCULAR; INTRAVENOUS at 15:14

## 2017-12-01 RX ADMIN — ENOXAPARIN SODIUM SCH MG: 40 INJECTION SUBCUTANEOUS at 15:15

## 2017-12-01 RX ADMIN — METHYLPREDNISOLONE SODIUM SUCCINATE SCH MLS/MIN: 1 INJECTION, POWDER, FOR SOLUTION INTRAMUSCULAR; INTRAVENOUS at 21:41

## 2017-12-01 RX ADMIN — ALBUTEROL SULFATE SCH MG: 2.5 SOLUTION RESPIRATORY (INHALATION) at 23:05

## 2017-12-01 RX ADMIN — ALBUTEROL SULFATE SCH MG: 2.5 SOLUTION RESPIRATORY (INHALATION) at 19:34

## 2017-12-01 NOTE — DIAGNOSTIC IMAGING REPORT
CHEST 2 VIEWS ROUTINE



CLINICAL HISTORY: 27 years-old Female presenting with wheezing. 



TECHNIQUE: PA and lateral views of the chest were obtained.



COMPARISON: 11/3/2017.



FINDINGS:

Cardiomediastinal silhouette normal. Lungs and pleural spaces clear. Osseous

structures normal. Upper abdomen normal.



IMPRESSION:

1.  No acute cardiopulmonary disease.







Electronically signed by:  Reji Zee M.D.

12/1/2017 8:28 AM



Dictated Date/Time:  12/1/2017 8:27 AM

## 2017-12-01 NOTE — HISTORY AND PHYSICAL
History & Physical


Date & Time of Service:


Dec 1, 2017 at 11:38


Chief Complaint:


Can't Breathe


Primary Care Physician:


Lakeisha Terry C.R.N.P


History of Present Illness


Source:  patient


26 y/o F Hx persistent asthma and tobacco abuse.  Pt states that she has had 

moderate to severe asthma symptoms for 2-3 months which have recently 

worsening.  She employed breathing treatments at home but has not tapered 

steroids as of yet.  The pt states that her daughter currently has pneumonia. 


the pt was audibly wheezing on arrival to the ER which persisted despite an hour

-long breathing treatment and IV steroid administration.  She has not had 

fevers at home.  She does have a productive cough which again has been present 

for an extended period.





Past Medical/Surgical History


1) Persistent asthma





2) Tobacco abuse





Surgical Problems:


(1) Hx of tonsillectomy





(2) [place] teeth extracted


  








Family History





FH: pulmonary embolism


Both parents alive and well





Social History


Smokes at least 1/2 pack cigarettes daily - odes not drink - she is currently 

unemployed and spends the days with her toddler


Smoking Status:  Current Every Day Smoker (1/2 ppd)


Drug Use:  none


Marital Status:  in relationship


Housing status:  lives with significant other


Occupational Status:  unemployed





Immunizations


History of Influenza Vaccine:  Unknown


History of Tetanus Vaccine?:  Unknown


History of Pneumococcal:  Unknown


History of Hepatitis B Vaccine:  Unknown





Multi-Drug Resistant Organisms


History of MDRO:  No





Allergies


Coded Allergies:  


     No Known Allergies (Verified , 12/1/17)





Home Medications


Scheduled


Albuterol Hfa (Ventolin Hfa), 2 PUFFS INH Q6H


Ipratropium-Albuterol (Duoneb), 1 TREATMENT INH Q4H





Review of Systems


Constitutional:  No fever, No chills, No sweats


Eyes:  No worsening of vision


ENT:  No hearing loss, No unusual epistaxis, No nasal symptoms


Respiratory:  + cough, + sputum, + wheezing, + shortness of breath


Cardiovascular:  No chest pain, No orthopnea, No PND


Abdomen:  No pain, No nausea, No vomiting


Musculoskeletal:  No joint pain


Genitourinary - Female:  No dysuria


Neurologic:  No memory loss, No paralysis


Psychiatric:  No depression symptoms


Endocrine:  No fatigue


Hematologic / Lymphatic:  No abnormal bleeding/bruising


Integumentary:  No rash


Allergic / Immunologic:  No environmental allergies





Physical Exam


Vital Signs











  Date Time  Temp Pulse Resp B/P (MAP) Pulse Ox O2 Delivery O2 Flow Rate FiO2


 


12/1/17 11:23  111 20 116/82 91 Room Air  


 


12/1/17 09:59  112 20 105/77 92 Room Air  


 


12/1/17 08:55  108 20 101/79 95 Nebulizer  


 


12/1/17 07:58  99 16  99   


 


12/1/17 07:49  101 20 110/74 93   


 


12/1/17 07:44  103      


 


12/1/17 07:40     94 Room Air  


 


12/1/17 07:03 36.8 117 18 133/84 93   








General Appearance:  WD/WN, no apparent distress


Head:  normocephalic


Eyes:  normal inspection


ENT:  normal ENT inspection, pharynx normal


Neck:  supple, no JVD


Respiratory/Chest:  chest non-tender, + pertinent finding (There is wheezing in 

all lung fields)


Cardiovascular:  regular rate, rhythm, no edema, no gallop


Abdomen/GI:  normal bowel sounds, non tender, soft


Back:  normal inspection, no CVA tenderness, no muscle spasm, normal range of 

motion


Extremities/Musculoskelatal:  normal inspection, no calf tenderness, normal 

capillary refill


Neurologic/Psych:  CNs II-XII nml as tested, no motor/sensory deficits, alert, 

oriented x 3


Skin:  normal color





Diagnostics


Laboratory Results





Results Past 24 Hours








Test


  12/1/17


07:44 Range/Units


 


 


White Blood Count 8.90 4.8-10.8  K/uL


 


Red Blood Count 4.72 4.2-5.4  M/uL


 


Hemoglobin 15.5 12.0-16.0  g/dL


 


Hematocrit 45.0 37-47  %


 


Mean Corpuscular Volume 95.3   fL


 


Mean Corpuscular Hemoglobin 32.8 25-34  pg


 


Mean Corpuscular Hemoglobin


Concent 34.4


  32-36  g/dl


 


 


Platelet Count 238 130-400  K/uL


 


Mean Platelet Volume 9.5 7.4-10.4  fL


 


Neutrophils (%) (Auto) 59.3  %


 


Lymphocytes (%) (Auto) 24.3  %


 


Monocytes (%) (Auto) 7.0  %


 


Eosinophils (%) (Auto) 8.5  %


 


Basophils (%) (Auto) 0.6  %


 


Neutrophils # (Auto) 5.28 1.4-6.5  K/uL


 


Lymphocytes # (Auto) 2.16 1.2-3.4  K/uL


 


Monocytes # (Auto) 0.62 0.11-0.59  K/uL


 


Eosinophils # (Auto) 0.76 0-0.5  K/uL


 


Basophils # (Auto) 0.05 0-0.2  K/uL


 


RDW Standard Deviation 42.8 36.4-46.3  fL


 


RDW Coefficient of Variation 12.3 11.5-14.5  %


 


Immature Granulocyte % (Auto) 0.3  %


 


Immature Granulocyte # (Auto) 0.03 0.00-0.02  K/uL


 


Sodium Level 139 136-145  mmol/L


 


Potassium Level 3.7 3.5-5.1  mmol/L


 


Chloride Level 105   mmol/L


 


Carbon Dioxide Level 25 21-32  mmol/L


 


Anion Gap 9.0 3-11  mmol/L


 


Blood Urea Nitrogen 9 7-18  mg/dl


 


Creatinine


  0.89


  0.60-1.20


mg/dl


 


Est Creatinine Clear Calc


Drug Dose 92.9


   ml/min


 


 


Estimated GFR (


American) 103.7


   


 


 


Estimated GFR (Non-


American 89.4


   


 


 


BUN/Creatinine Ratio 10.2 10-20  


 


Random Glucose 83 70-99  mg/dl


 


Calcium Level 9.0 8.5-10.1  mg/dl








CXR normal





Impression


Assessment and Plan


26 y/o F Hx persistent asthma and tobacco abuse.  Pt states that she has had 

moderate to severe asthma symptoms for 2-3 months which have recently 

worsening.  She employed breathing treatments at home but has not tapered 

steroids as of yet.  The pt states that her daughter currently has pneumonia. 


the pt was audibly wheezing on arrival to the ER which persisted despite an hour

-long breathing treatment and IV steroid administration.  She has not had 

fevers at home.  She does have a productive cough which again has been present 

for an extended period.





The pt is admitted with status asthmaticus.  She will remain on Albuterol, 

Solumedrol and an 02 protocol as needed.  We have no current evidence of 

bacterial PNM.  A rapid flu is pending at the time of admission - she does not 

believe in the flu vaccine and therefore has not had one this year.  She does 

state that her toddler is vaccinated. 





The pt may well be better served with a long-acting medication and a rescue 

inhaler going forward.  She should likely be referred to a pulmonologist on DC 

to better manage her disease.





She is aware that smoking does not help her cause but does not indicate that 

she would like to quit presently.  Cessation advice should be provided prior to 

DC.








Full code - Lovenox prophylaxis


Total time for this admit including review of labs, meds, imaging - discussion 

with pot and ER attending - 33 min





Level of Care


Telemetry





Resuscitation Status


FULL RESUSCITATION





VTE Prophylaxis


VTE Risk Assessment Done? Y/N:  Yes


Risk Level:  Very Low


Given or contraindicated:  Enoxaparin (Lovenox)SQ

## 2017-12-02 VITALS
HEART RATE: 102 BPM | DIASTOLIC BLOOD PRESSURE: 69 MMHG | TEMPERATURE: 98.42 F | OXYGEN SATURATION: 94 % | SYSTOLIC BLOOD PRESSURE: 114 MMHG

## 2017-12-02 VITALS
DIASTOLIC BLOOD PRESSURE: 61 MMHG | TEMPERATURE: 98.24 F | SYSTOLIC BLOOD PRESSURE: 104 MMHG | OXYGEN SATURATION: 93 % | HEART RATE: 90 BPM

## 2017-12-02 VITALS — HEART RATE: 90 BPM | OXYGEN SATURATION: 97 %

## 2017-12-02 VITALS
HEART RATE: 101 BPM | TEMPERATURE: 98.06 F | DIASTOLIC BLOOD PRESSURE: 68 MMHG | SYSTOLIC BLOOD PRESSURE: 111 MMHG | OXYGEN SATURATION: 93 %

## 2017-12-02 VITALS — OXYGEN SATURATION: 93 % | HEART RATE: 107 BPM

## 2017-12-02 VITALS
TEMPERATURE: 98.24 F | SYSTOLIC BLOOD PRESSURE: 119 MMHG | OXYGEN SATURATION: 92 % | HEART RATE: 104 BPM | DIASTOLIC BLOOD PRESSURE: 69 MMHG

## 2017-12-02 VITALS — HEART RATE: 99 BPM | OXYGEN SATURATION: 93 %

## 2017-12-02 VITALS — DIASTOLIC BLOOD PRESSURE: 66 MMHG | TEMPERATURE: 98.96 F | SYSTOLIC BLOOD PRESSURE: 105 MMHG | OXYGEN SATURATION: 94 %

## 2017-12-02 VITALS — OXYGEN SATURATION: 95 % | HEART RATE: 102 BPM

## 2017-12-02 VITALS
SYSTOLIC BLOOD PRESSURE: 109 MMHG | HEART RATE: 93 BPM | DIASTOLIC BLOOD PRESSURE: 68 MMHG | TEMPERATURE: 98.42 F | OXYGEN SATURATION: 96 %

## 2017-12-02 VITALS — OXYGEN SATURATION: 93 %

## 2017-12-02 VITALS — OXYGEN SATURATION: 95 %

## 2017-12-02 VITALS — HEART RATE: 99 BPM | OXYGEN SATURATION: 91 %

## 2017-12-02 LAB
PREG INTERNAL NEGATIVE QC: (no result)
PREG INTERNAL POSITIVE QC: (no result)

## 2017-12-02 RX ADMIN — CALCIUM CARBONATE PRN MG: 500 TABLET ORAL at 20:18

## 2017-12-02 RX ADMIN — GUAIFENESIN SCH MG: 600 TABLET, EXTENDED RELEASE ORAL at 11:56

## 2017-12-02 RX ADMIN — METHYLPREDNISOLONE SODIUM SUCCINATE SCH MLS/MIN: 1 INJECTION, POWDER, FOR SOLUTION INTRAMUSCULAR; INTRAVENOUS at 04:10

## 2017-12-02 RX ADMIN — ALBUTEROL SULFATE SCH MG: 2.5 SOLUTION RESPIRATORY (INHALATION) at 06:58

## 2017-12-02 RX ADMIN — FLUTICASONE PROPIONATE AND SALMETEROL SCH PUFF: 50; 250 POWDER RESPIRATORY (INHALATION) at 20:18

## 2017-12-02 RX ADMIN — METHYLPREDNISOLONE SODIUM SUCCINATE SCH MLS/MIN: 1 INJECTION, POWDER, FOR SOLUTION INTRAMUSCULAR; INTRAVENOUS at 20:18

## 2017-12-02 RX ADMIN — METHYLPREDNISOLONE SODIUM SUCCINATE SCH MLS/MIN: 1 INJECTION, POWDER, FOR SOLUTION INTRAMUSCULAR; INTRAVENOUS at 10:21

## 2017-12-02 RX ADMIN — GUAIFENESIN SCH MG: 600 TABLET, EXTENDED RELEASE ORAL at 20:18

## 2017-12-02 RX ADMIN — IPRATROPIUM BROMIDE AND ALBUTEROL SULFATE SCH ML: .5; 3 SOLUTION RESPIRATORY (INHALATION) at 16:02

## 2017-12-02 RX ADMIN — METHYLPREDNISOLONE SODIUM SUCCINATE SCH MLS/MIN: 1 INJECTION, POWDER, FOR SOLUTION INTRAMUSCULAR; INTRAVENOUS at 17:08

## 2017-12-02 RX ADMIN — IPRATROPIUM BROMIDE AND ALBUTEROL SULFATE SCH ML: .5; 3 SOLUTION RESPIRATORY (INHALATION) at 10:30

## 2017-12-02 RX ADMIN — FLUTICASONE PROPIONATE AND SALMETEROL SCH PUFF: 50; 250 POWDER RESPIRATORY (INHALATION) at 13:38

## 2017-12-02 RX ADMIN — ENOXAPARIN SODIUM SCH MG: 40 INJECTION SUBCUTANEOUS at 16:00

## 2017-12-02 RX ADMIN — IPRATROPIUM BROMIDE AND ALBUTEROL SULFATE SCH ML: .5; 3 SOLUTION RESPIRATORY (INHALATION) at 19:49

## 2017-12-02 RX ADMIN — IPRATROPIUM BROMIDE AND ALBUTEROL SULFATE SCH ML: .5; 3 SOLUTION RESPIRATORY (INHALATION) at 11:14

## 2017-12-02 RX ADMIN — CALCIUM CARBONATE PRN MG: 500 TABLET ORAL at 13:37

## 2017-12-02 RX ADMIN — ALBUTEROL SULFATE SCH MG: 2.5 SOLUTION RESPIRATORY (INHALATION) at 03:30

## 2017-12-02 NOTE — PROGRESS NOTE
Subjective


Date of Service:


Dec 2, 2017.


Subjective


Pt evaluation today including:  conversation w/ patient, physical exam, chart 

review, lab review, review of studies (cxr), review of inpatient medication list


Pain:  none


PO Intake:  normal


Voiding:  no voiding problems


tele stable overnight 





she continues with wheezing and dyspnea


cough is largely nonproductive


no fever





has had wheezing/cough/dyspnea for 2-3 months (basically daily symptoms) with 

near-daily albuterol usage





reports some GERD symptoms; minimal allergy symptoms, although has a cat at 

home and is allergic to such





continues to smoke





LMP - currently





Problem List


Medical Problems:


(1) Acute asthma exacerbation


Status: Acute  





(2) Acute pharyngitis


Status: Acute  





(3) Asthma


Status: Chronic  





(4) Asthma exacerbation


Status: Acute  





(5) Asthma exacerbation


Status: Acute  





(6) Back strain


Status: Acute  





(7) Lip swelling


Status: Acute  





(8) Pneumonia


Status: Acute  





(9) Pneumonia


Status: Acute  





(10) Pneumonia


Status: Acute  





(11) Sepsis


Status: Acute  











Review of Systems


Constitutional:  No fever, No chills


Respiratory:  + cough, + wheezing, + shortness of breath, + dyspnea on exertion

, No sputum, No hemoptysis


Cardiac:  No chest pain, No orthopnea


Abdomen:  No pain





Objective


Vital Signs











  Date Time  Temp Pulse Resp B/P (MAP) Pulse Ox O2 Delivery O2 Flow Rate FiO2


 


12/2/17 16:03  102 16  95 Room Air  


 


12/2/17 16:00      Room Air  


 


12/2/17 15:46 37.2  20 105/66 (79) 94 Room Air  


 


12/2/17 12:00      Room Air  


 


12/2/17 11:27 36.8 104 19 119/69 (86) 92 Room Air  


 


12/2/17 11:15  99 16  93 Room Air  


 


12/2/17 08:01 36.9 102 19 114/69 (84) 94 Room Air  


 


12/2/17 08:00      Room Air  


 


12/2/17 06:58  99 16  91   


 


12/2/17 04:00     93 Room Air  


 


12/2/17 03:42 36.7 101 18 111/68 (82) 93 Room Air  


 


12/2/17 03:31  107 16  93   


 


12/2/17 00:00     95 Room Air  


 


12/1/17 23:58 36.8 98 18 118/76 (90) 95 Room Air  


 


12/1/17 23:06  90 16  94   


 


12/1/17 21:33 37.0 98 22 120/73 (89) 90 Room Air  


 


12/1/17 20:00     90 Room Air  


 


12/1/17 19:34  117 16  93   











Physical Exam


General Appearance:  no apparent distress


ENT:  + pertinent finding (posterior throat cobblestoning )


Neck:  no JVD


Respiratory/Chest:  no respiratory distress, no accessory muscle use, + 

decreased breath sounds (bases), + rhonchi, + wheezing (extensive, b/l, all 

lobes)


Cardiovascular:  no gallop, no murmur, + tachycardia


Abdomen:  normal bowel sounds, non tender, soft, no organomegaly


Extremities:  no pedal edema


Neurologic/Psychiatric:  alert, oriented x 3





Laboratory Results





Last 24 Hours








Test


  12/2/17


13:10


 


Urine Pregnancy Test NEG 











Assessment and Plan


26yo female -





1.  severe, persistent asthma by history with current exacerbation - 


no change in IV steroids today.


add mucinex BID.


add incentive spirometry.


no indication for abx at this time.





based on the staging of her asthma (severe, persistent) add advair + singulair. 


needs to quit smoking!





change observation to full admit - she may need 1-2 more days of IV steroids it 

not longer. 





2.  tobacco dependence -  to quit.





3.  allergic rhinitis - will need allergy testing after d/c. 





4.  eosinophilia on cbc - 2nd to #1, #3.  





5.  check urine HCG.  





6.  DVT proph - lovenox. 








would benefit from pulmonary referral, PFTs, allergy testing after d/c


Continued [facility] stay due to:  multiple IV medications needed


Discharge planning:  home

## 2017-12-03 VITALS
TEMPERATURE: 98.42 F | DIASTOLIC BLOOD PRESSURE: 69 MMHG | SYSTOLIC BLOOD PRESSURE: 106 MMHG | HEART RATE: 85 BPM | OXYGEN SATURATION: 96 %

## 2017-12-03 VITALS — OXYGEN SATURATION: 96 % | HEART RATE: 79 BPM

## 2017-12-03 VITALS
DIASTOLIC BLOOD PRESSURE: 63 MMHG | SYSTOLIC BLOOD PRESSURE: 105 MMHG | TEMPERATURE: 98.24 F | HEART RATE: 91 BPM | OXYGEN SATURATION: 95 %

## 2017-12-03 VITALS
TEMPERATURE: 98.06 F | SYSTOLIC BLOOD PRESSURE: 119 MMHG | HEART RATE: 95 BPM | DIASTOLIC BLOOD PRESSURE: 68 MMHG | OXYGEN SATURATION: 94 %

## 2017-12-03 VITALS — OXYGEN SATURATION: 96 % | HEART RATE: 87 BPM

## 2017-12-03 VITALS — OXYGEN SATURATION: 96 % | HEART RATE: 106 BPM

## 2017-12-03 VITALS
OXYGEN SATURATION: 93 % | TEMPERATURE: 98.06 F | HEART RATE: 83 BPM | DIASTOLIC BLOOD PRESSURE: 76 MMHG | SYSTOLIC BLOOD PRESSURE: 120 MMHG

## 2017-12-03 VITALS
SYSTOLIC BLOOD PRESSURE: 112 MMHG | DIASTOLIC BLOOD PRESSURE: 73 MMHG | OXYGEN SATURATION: 95 % | HEART RATE: 92 BPM | TEMPERATURE: 97.88 F

## 2017-12-03 VITALS — HEART RATE: 78 BPM | OXYGEN SATURATION: 96 %

## 2017-12-03 VITALS — HEART RATE: 93 BPM | OXYGEN SATURATION: 97 %

## 2017-12-03 RX ADMIN — GUAIFENESIN SCH MG: 600 TABLET, EXTENDED RELEASE ORAL at 09:09

## 2017-12-03 RX ADMIN — ENOXAPARIN SODIUM SCH MG: 40 INJECTION SUBCUTANEOUS at 16:40

## 2017-12-03 RX ADMIN — IPRATROPIUM BROMIDE AND ALBUTEROL SULFATE SCH ML: .5; 3 SOLUTION RESPIRATORY (INHALATION) at 14:02

## 2017-12-03 RX ADMIN — FLUTICASONE PROPIONATE AND SALMETEROL SCH PUFF: 50; 250 POWDER RESPIRATORY (INHALATION) at 20:23

## 2017-12-03 RX ADMIN — METHYLPREDNISOLONE SODIUM SUCCINATE SCH MLS/MIN: 1 INJECTION, POWDER, FOR SOLUTION INTRAMUSCULAR; INTRAVENOUS at 03:23

## 2017-12-03 RX ADMIN — GUAIFENESIN SCH MG: 600 TABLET, EXTENDED RELEASE ORAL at 20:23

## 2017-12-03 RX ADMIN — IPRATROPIUM BROMIDE AND ALBUTEROL SULFATE SCH ML: .5; 3 SOLUTION RESPIRATORY (INHALATION) at 05:02

## 2017-12-03 RX ADMIN — METHYLPREDNISOLONE SODIUM SUCCINATE SCH MLS/MIN: 1 INJECTION, POWDER, FOR SOLUTION INTRAMUSCULAR; INTRAVENOUS at 09:08

## 2017-12-03 RX ADMIN — CALCIUM CARBONATE PRN MG: 500 TABLET ORAL at 20:25

## 2017-12-03 RX ADMIN — FLUTICASONE PROPIONATE AND SALMETEROL SCH PUFF: 50; 250 POWDER RESPIRATORY (INHALATION) at 09:08

## 2017-12-03 RX ADMIN — IPRATROPIUM BROMIDE AND ALBUTEROL SULFATE SCH ML: .5; 3 SOLUTION RESPIRATORY (INHALATION) at 11:11

## 2017-12-03 RX ADMIN — CALCIUM CARBONATE PRN MG: 500 TABLET ORAL at 03:23

## 2017-12-03 RX ADMIN — IPRATROPIUM BROMIDE AND ALBUTEROL SULFATE SCH ML: .5; 3 SOLUTION RESPIRATORY (INHALATION) at 05:03

## 2017-12-03 RX ADMIN — CALCIUM CARBONATE PRN MG: 500 TABLET ORAL at 16:39

## 2017-12-03 RX ADMIN — CALCIUM CARBONATE PRN MG: 500 TABLET ORAL at 10:24

## 2017-12-03 RX ADMIN — IPRATROPIUM BROMIDE AND ALBUTEROL SULFATE SCH ML: .5; 3 SOLUTION RESPIRATORY (INHALATION) at 07:22

## 2017-12-03 RX ADMIN — IPRATROPIUM BROMIDE AND ALBUTEROL SULFATE SCH ML: .5; 3 SOLUTION RESPIRATORY (INHALATION) at 19:41

## 2017-12-04 NOTE — PROGRESS NOTE
Subjective


Date of Service:


late entry for visit Dec 3, 2017.


Subjective


Pt evaluation today including:  conversation w/ patient, physical exam, chart 

review, lab review


Pain:  none


PO Intake:  normal


Voiding:  no voiding problems


tele stable; sinus tach with activity





feels better


still coughing/wheezing but improved





has a court date (for her ex-boyfriend) tomorrow AM but she is willing to stay 

until the AM





Problem List


Medical Problems:


(1) Acute asthma exacerbation


Status: Acute  





(2) Acute pharyngitis


Status: Acute  





(3) Asthma


Status: Chronic  





(4) Asthma exacerbation


Status: Acute  





(5) Asthma exacerbation


Status: Acute  





(6) Back strain


Status: Acute  





(7) Lip swelling


Status: Acute  





(8) Pneumonia


Status: Acute  





(9) Pneumonia


Status: Acute  





(10) Pneumonia


Status: Acute  





(11) Sepsis


Status: Acute  











Review of Systems


Constitutional:  No fever


Cardiac:  No chest pain, No orthopnea


Abdomen:  No pain





Objective


Vital Signs











  Date Time  Temp Pulse Resp B/P (MAP) Pulse Ox O2 Delivery O2 Flow Rate FiO2


 


12/3/17 20:00     96 Room Air  


 


12/3/17 20:00 36.9 85 18 106/69 (81) 96 Room Air  


 


12/3/17 19:41  106 16  96 Room Air  


 


12/3/17 16:35 36.7 95 16 119/68 (85) 94 Room Air  


 


12/3/17 16:00      Room Air  


 


12/3/17 14:03  79 16  96 Room Air  


 


12/3/17 12:00      Room Air  


 


12/3/17 11:41 36.7 83 18 120/76 (91) 93 Room Air  


 


12/3/17 11:12  78 16  96 Room Air  


 


12/3/17 08:00      Room Air  


 


12/3/17 07:53 36.6 92 18 112/73 (86) 95 Room Air  


 


12/3/17 07:23  87 16  96 Room Air  











Physical Exam


General Appearance:  no apparent distress


ENT:  pharynx normal


Neck:  no JVD


Respiratory/Chest:  no respiratory distress, no accessory muscle use, + 

pertinent finding (airation improved from yesterday; better breath sounds bases

, still wheezing but improved as well; occasional rhonchi)


Cardiovascular:  no gallop, no murmur, + tachycardia


Abdomen:  normal bowel sounds, non tender, soft, no organomegaly


Extremities:  no pedal edema





Assessment and Plan


26yo female -





1.  severe, persistent asthma by history with current exacerbation - 


improving. 


lower solumedrol to 60 q12h. 


can likely d/c home tomorrow on steroid taper. 


no indication for abx at this time





based on the staging of her asthma (severe, persistent) added advair + 

singulair. 


needs to quit smoking!





2.  tobacco dependence - counseled to quit multiple times. 





3.  allergic rhinitis - will need allergy testing after d/c. 





4.  eosinophilia on cbc - 2nd to #1, #3.  





5.  urine HCG neg.





6.  DVT proph - lovenox. 








would benefit from pulmonary referral, PFTs, allergy testing after d/c 


anticipate d/c in AM


Continued [facility] stay due to:  multiple IV medications needed


Discharge planning:  home

## 2017-12-06 NOTE — DISCHARGE SUMMARY
Discharge Summary


Date of Service


Dec 6, 2017.





Discharge Summary


Admission Date:


Dec 2, 2017 at 10:31


Discharge Date:  Dec 3, 2017


Discharge Disposition:  Home


Principal Diagnosis:  asthma with exacerbation


Problems/Secondary Diagnoses:


1.  poorly controlled severe, persistent asthma


2.  allergic rhinitis


3.  tobacco dependence


4.  noncompliance


Immunizations:  


   Have You Had Influenza Vaccine:  Unknown


   History of Tetanus Vaccine?:  Unknown


   History of Pneumococcal:  Unknown


   History of Hepatitis B Vaccine:  Unknown


Procedures:


chest x-ray - normal





Medication Reconciliation


New Medications:  


Mometasone Furoate-Formoterol (Dulera 100/5 Mcg) 1 Aer Aer


2 PUFFS INH BID for 30 Days, #1 INHALER 2 Refills





Montelukast Sodium (Singulair) 10 Mg Tab


1 TAB PO HS for 30 Days, #30 TAB 2 Refills





Prednisone (Prednisone) 10 Mg Tab


10 MG PO AS DIRECTED, #41 TAB 0 Refills


6tbs po QD x2 days; 5tbs po QD x2 days; 4tbs po QD x2 days; 3tbs


 po QD x2 days; 2tbs po QD x2 days; 1 po QD x 1day.


 


Continued Medications:  


Albuterol Hfa (Ventolin Hfa) 200 Puffs/77327 Mcg Aers


2 PUFFS INH Q6H, INHALER





Ipratropium-Albuterol (Duoneb) 3 Ml Nebu


1 TREATMENT INH Q4H, INHA











Discharge Exam


Physical Exam:  


   General Appearance:  no apparent distress


   ENT:  pharynx normal (some cobblestoning posterior wall)


   Neck:  no JVD


   Respiratory/Chest:  no respiratory distress, no accessory muscle use, + 

decreased breath sounds (bases), + wheezing (mild end-exp)


   Cardiovascular:  no gallop, no murmur, normal peripheral pulses, + 

tachycardia


   Abdomen / GI:  normal bowel sounds, non tender, soft, no organomegaly


   Extremities:  no pedal edema


   Neurologic/Psychiatric:  alert, oriented x 3


   Skin:  no rash





Hospital Course


HISTORY OF PRESENT ILLNESS:


26 y/o female with history of persistent asthma and tobacco abuse.  Pt states 

that she has had moderate to severe asthma symptoms for 2-3 months which have 

recently worsening.  She employed breathing treatments at home but has not 

tapered steroids as of yet.  The pt states that her daughter currently has 

pneumonia. 


the pt was audibly wheezing on arrival to the ER which persisted despite an hour

-long breathing treatment and IV steroid administration.  She has not had 

fevers at home.  She does have a productive cough which again has been present 

for an extended period.








HOSPITAL COURSE: 





1.  severe, persistent asthma by history with current exacerbation - the latter 

was treated with high-dose IV steroids, scheduled nebs, and mucolytics along 

with pulmonary toilet. 


She made nice, gradual improvement in all pulmonary symptoms with these 

measures.  


She had no acute indication for antibiotics.  





Her asthma staging, by history, was that of severe, persistent asthma and thus 

an inhaled corticosteroid, long-acting bronchodilator, and singulair were all 

added. 


She was counseled several times on the importance of smoking cessation and its 

role in ongoing asthma symptoms. 





On the evening of 12/3/17 the patient left AMA, stating that she needed to get 

home to tend to her 4yo daughter.  





On 12/4/17 prescriptions for dulera, singulair, and a slow prednisone taper 

were called into [facility] for her. 


An appointment with Dr. Duong Quintero, [facility], was established for 

later in December.   








2.  allergic rhinitis - I recommended allergy testing for her after discharge.  

She reports severe cat allergy and currently has cats within her home.


Total Time Spent:  Greater than 30 minutes


This includes examination of the patient, discharge planning, medication 

reconciliation, and communication with other providers.





Discharge Instructions


Please refer to the electronic Patient Visit Report (Discharge Instructions) 

for additional information.





Follow-Up


1.  Dr. Duong Quintero - [facility] -Tuesday December 19th at 8:30 am


2.  ALMA Lema - within 3-5 days of discharge





Additional Copies To


Lakeisha Terry C.R.N.P; Duong Quintero M.D.

## 2018-01-29 ENCOUNTER — HOSPITAL ENCOUNTER (INPATIENT)
Dept: HOSPITAL 45 - C.EDB | Age: 28
LOS: 3 days | Discharge: HOME | DRG: 193 | End: 2018-02-01
Attending: HOSPITALIST | Admitting: HOSPITALIST
Payer: COMMERCIAL

## 2018-01-29 VITALS
HEART RATE: 122 BPM | TEMPERATURE: 98.96 F | SYSTOLIC BLOOD PRESSURE: 96 MMHG | OXYGEN SATURATION: 92 % | DIASTOLIC BLOOD PRESSURE: 61 MMHG

## 2018-01-29 VITALS
SYSTOLIC BLOOD PRESSURE: 110 MMHG | OXYGEN SATURATION: 94 % | TEMPERATURE: 98.96 F | HEART RATE: 107 BPM | DIASTOLIC BLOOD PRESSURE: 69 MMHG

## 2018-01-29 VITALS — OXYGEN SATURATION: 94 % | SYSTOLIC BLOOD PRESSURE: 108 MMHG | HEART RATE: 103 BPM | DIASTOLIC BLOOD PRESSURE: 68 MMHG

## 2018-01-29 VITALS
DIASTOLIC BLOOD PRESSURE: 81 MMHG | SYSTOLIC BLOOD PRESSURE: 140 MMHG | HEART RATE: 116 BPM | OXYGEN SATURATION: 94 % | TEMPERATURE: 98.6 F

## 2018-01-29 VITALS — HEART RATE: 101 BPM | DIASTOLIC BLOOD PRESSURE: 94 MMHG | OXYGEN SATURATION: 93 % | SYSTOLIC BLOOD PRESSURE: 121 MMHG

## 2018-01-29 VITALS
DIASTOLIC BLOOD PRESSURE: 88 MMHG | TEMPERATURE: 99.14 F | SYSTOLIC BLOOD PRESSURE: 130 MMHG | OXYGEN SATURATION: 98 % | HEART RATE: 114 BPM

## 2018-01-29 VITALS — HEART RATE: 114 BPM | OXYGEN SATURATION: 92 %

## 2018-01-29 VITALS — DIASTOLIC BLOOD PRESSURE: 98 MMHG | OXYGEN SATURATION: 93 % | SYSTOLIC BLOOD PRESSURE: 134 MMHG | HEART RATE: 113 BPM

## 2018-01-29 VITALS
BODY MASS INDEX: 28.87 KG/M2 | HEIGHT: 63 IN | BODY MASS INDEX: 28.87 KG/M2 | WEIGHT: 162.92 LBS | HEIGHT: 63 IN | WEIGHT: 162.92 LBS

## 2018-01-29 VITALS — DIASTOLIC BLOOD PRESSURE: 81 MMHG | OXYGEN SATURATION: 94 % | HEART RATE: 115 BPM | SYSTOLIC BLOOD PRESSURE: 140 MMHG

## 2018-01-29 VITALS — SYSTOLIC BLOOD PRESSURE: 133 MMHG | HEART RATE: 106 BPM | DIASTOLIC BLOOD PRESSURE: 75 MMHG | OXYGEN SATURATION: 96 %

## 2018-01-29 VITALS — HEART RATE: 106 BPM | OXYGEN SATURATION: 94 %

## 2018-01-29 VITALS — OXYGEN SATURATION: 94 % | HEART RATE: 140 BPM

## 2018-01-29 VITALS — OXYGEN SATURATION: 94 % | HEART RATE: 110 BPM

## 2018-01-29 VITALS — HEART RATE: 113 BPM | OXYGEN SATURATION: 92 %

## 2018-01-29 VITALS — OXYGEN SATURATION: 94 %

## 2018-01-29 DIAGNOSIS — E87.6: ICD-10-CM

## 2018-01-29 DIAGNOSIS — J96.01: ICD-10-CM

## 2018-01-29 DIAGNOSIS — J10.1: Primary | ICD-10-CM

## 2018-01-29 DIAGNOSIS — J45.901: ICD-10-CM

## 2018-01-29 DIAGNOSIS — E87.8: ICD-10-CM

## 2018-01-29 DIAGNOSIS — E83.42: ICD-10-CM

## 2018-01-29 DIAGNOSIS — F17.200: ICD-10-CM

## 2018-01-29 DIAGNOSIS — I95.9: ICD-10-CM

## 2018-01-29 DIAGNOSIS — N17.9: ICD-10-CM

## 2018-01-29 LAB
ALBUMIN SERPL-MCNC: 4 GM/DL (ref 3.4–5)
ALP SERPL-CCNC: 67 U/L (ref 45–117)
ALT SERPL-CCNC: 19 U/L (ref 12–78)
AST SERPL-CCNC: 19 U/L (ref 15–37)
BASOPHILS # BLD: 0.02 K/UL (ref 0–0.2)
BASOPHILS NFR BLD: 0.3 %
BUN SERPL-MCNC: 12 MG/DL (ref 7–18)
BUN SERPL-MCNC: 9 MG/DL (ref 7–18)
CALCIUM SERPL-MCNC: 8.5 MG/DL (ref 8.5–10.1)
CALCIUM SERPL-MCNC: 8.5 MG/DL (ref 8.5–10.1)
CO2 SERPL-SCNC: 20 MMOL/L (ref 21–32)
CO2 SERPL-SCNC: 22 MMOL/L (ref 21–32)
CREAT SERPL-MCNC: 0.88 MG/DL (ref 0.6–1.2)
CREAT SERPL-MCNC: 1.15 MG/DL (ref 0.6–1.2)
EOS ABS #: 0 K/UL (ref 0–0.5)
EOSINOPHIL NFR BLD AUTO: 156 K/UL (ref 130–400)
GLUCOSE SERPL-MCNC: 123 MG/DL (ref 70–99)
GLUCOSE SERPL-MCNC: 156 MG/DL (ref 70–99)
HCT VFR BLD CALC: 44.1 % (ref 37–47)
HGB BLD-MCNC: 15.2 G/DL (ref 12–16)
IG#: 0.02 K/UL (ref 0–0.02)
IMM GRANULOCYTES NFR BLD AUTO: 4.9 %
INFLUENZA B ANTIGEN: (no result)
LYMPHOCYTES # BLD: 0.35 K/UL (ref 1.2–3.4)
MCH RBC QN AUTO: 32.9 PG (ref 25–34)
MCHC RBC AUTO-ENTMCNC: 34.5 G/DL (ref 32–36)
MCV RBC AUTO: 95.5 FL (ref 80–100)
MONO ABS #: 0.24 K/UL (ref 0.11–0.59)
MONOCYTES NFR BLD: 3.4 %
NEUT ABS #: 6.5 K/UL (ref 1.4–6.5)
NEUTROPHILS # BLD AUTO: 0 %
NEUTROPHILS NFR BLD AUTO: 91.1 %
PHOSPHATE SERPL-MCNC: 3.8 MG/DL (ref 2.5–4.9)
PMV BLD AUTO: 9.9 FL (ref 7.4–10.4)
POTASSIUM SERPL-SCNC: 3.4 MMOL/L (ref 3.5–5.1)
POTASSIUM SERPL-SCNC: 4.4 MMOL/L (ref 3.5–5.1)
PROT SERPL-MCNC: 7.6 GM/DL (ref 6.4–8.2)
RED CELL DISTRIBUTION WIDTH CV: 12.7 % (ref 11.5–14.5)
RED CELL DISTRIBUTION WIDTH SD: 43.9 FL (ref 36.4–46.3)
SODIUM SERPL-SCNC: 136 MMOL/L (ref 136–145)
SODIUM SERPL-SCNC: 139 MMOL/L (ref 136–145)
WBC # BLD AUTO: 7.13 K/UL (ref 4.8–10.8)

## 2018-01-29 RX ADMIN — SODIUM CHLORIDE AND POTASSIUM CHLORIDE SCH MLS/HR: 9; 1.49 INJECTION, SOLUTION INTRAVENOUS at 19:48

## 2018-01-29 RX ADMIN — IPRATROPIUM BROMIDE AND ALBUTEROL SULFATE SCH ML: .5; 3 SOLUTION RESPIRATORY (INHALATION) at 20:00

## 2018-01-29 RX ADMIN — IPRATROPIUM BROMIDE AND ALBUTEROL SULFATE SCH ML: .5; 3 SOLUTION RESPIRATORY (INHALATION) at 12:00

## 2018-01-29 RX ADMIN — OSELTAMIVIR PHOSPHATE SCH MG: 75 CAPSULE ORAL at 19:49

## 2018-01-29 RX ADMIN — METHYLPREDNISOLONE SODIUM SUCCINATE SCH MLS/MIN: 1 INJECTION, POWDER, FOR SOLUTION INTRAMUSCULAR; INTRAVENOUS at 19:48

## 2018-01-29 RX ADMIN — IPRATROPIUM BROMIDE AND ALBUTEROL SULFATE SCH ML: .5; 3 SOLUTION RESPIRATORY (INHALATION) at 15:02

## 2018-01-29 RX ADMIN — ENOXAPARIN SODIUM SCH MG: 40 INJECTION SUBCUTANEOUS at 19:49

## 2018-01-29 RX ADMIN — NICOTINE SCH PATCH: 7 PATCH, EXTENDED RELEASE TRANSDERMAL at 14:00

## 2018-01-29 RX ADMIN — RANITIDINE SCH MG: 150 TABLET ORAL at 19:50

## 2018-01-29 RX ADMIN — MORPHINE SULFATE PRN MG: 2 INJECTION, SOLUTION INTRAMUSCULAR; INTRAVENOUS at 18:41

## 2018-01-29 RX ADMIN — SODIUM CHLORIDE AND POTASSIUM CHLORIDE SCH MLS/HR: 9; 1.49 INJECTION, SOLUTION INTRAVENOUS at 12:40

## 2018-01-29 RX ADMIN — ENOXAPARIN SODIUM SCH MG: 40 INJECTION SUBCUTANEOUS at 21:00

## 2018-01-29 RX ADMIN — MORPHINE SULFATE PRN MG: 2 INJECTION, SOLUTION INTRAMUSCULAR; INTRAVENOUS at 21:50

## 2018-01-29 RX ADMIN — POTASSIUM CHLORIDE SCH MLS/HR: 10 INJECTION, SOLUTION INTRAVENOUS at 13:49

## 2018-01-29 RX ADMIN — BUDESONIDE AND FORMOTEROL FUMARATE DIHYDRATE SCH PUFFS: 160; 4.5 AEROSOL RESPIRATORY (INHALATION) at 19:51

## 2018-01-29 RX ADMIN — MONTELUKAST SODIUM SCH MG: 10 TABLET, FILM COATED ORAL at 19:50

## 2018-01-29 RX ADMIN — METHYLPREDNISOLONE SODIUM SUCCINATE SCH MLS/MIN: 1 INJECTION, POWDER, FOR SOLUTION INTRAMUSCULAR; INTRAVENOUS at 13:49

## 2018-01-29 RX ADMIN — MORPHINE SULFATE PRN MG: 2 INJECTION, SOLUTION INTRAMUSCULAR; INTRAVENOUS at 15:30

## 2018-01-29 RX ADMIN — POTASSIUM CHLORIDE SCH MLS/HR: 10 INJECTION, SOLUTION INTRAVENOUS at 16:11

## 2018-01-29 NOTE — DIAGNOSTIC IMAGING REPORT
CHEST ONE VIEW PORTABLE



CLINICAL HISTORY: 27 years-old Female presenting with EVALUATE RESPIRATORY

DISTRESS.DYSPNEA. 



TECHNIQUE: Portable upright AP view of the chest was obtained.



COMPARISON: 12/1/2017.



FINDINGS:

Cardiomediastinal silhouette normal. Prominence of the left hilum most likely

vascular in etiology. Lungs and pleural spaces clear. Osseous structures normal.

Upper abdomen normal.



IMPRESSION:

1.  Prominence of the left hilum most likely vascular in etiology. No convincing

evidence of acute cardiopulmonary disease.







Electronically signed by:  Reji Zee M.D.

1/29/2018 10:11 AM



Dictated Date/Time:  1/29/2018 10:10 AM

## 2018-01-29 NOTE — CRITICAL CARE PROGRESS NOTE
Critical Care Progress Note


Date of Service


Jan 29, 2018.





Critical Care Progress Note


Examined pt at the start of shift.  Found her sitting on the edge of the bed 

breathing 30-40bpm.  She stated she didn't feel much better. She was obviously 

working to breath while on BiPap. Physical exam demonstrated inspiratory and 

expiratory wheezing with little air movement.  BiPap demonstrated adequate 

tidal volumes. 





* Ordered BiPap be switched to humidified circuit. 


* Gave 1 hr long nebulizer, with mild relief (Pt still breathing at 32bpm)


* Spoke with Dr. Fitzgerald about starting Ketamine infusion for bronchodilation, 

he approved. 


* Rechecked electrolytes goal Mg 2.5





Additional Critical Care time: 35 minutes





I personally improved of the dosing calculation and administration.  At risk 

for respiratory failure.

## 2018-01-29 NOTE — EMERGENCY ROOM VISIT NOTE
History


Report prepared by Vasu:  Jaye Wharton


Under the Supervision of:  Dr. Mani Bach M.D.


First contact with patient:  09:42


Chief Complaint:  RESPIRATORY PROBLEMS


Stated Complaint:  RESPIRATORY





History of Present Illness


The patient is a 27 year old female who presents to the Emergency Room with 

complaints of constant shortness of breath beginning last night. She denies 

feeling sick over the past couple days. The patient reports a fever, body aches

, and a cough. The patient has a history of asthma. She denies taking anything 

for her fever. History limited secondary to distress and dyspnea.





   Source of History:  patient


   History Limited By:  dyspnea


   Onset:  last night


   Position:  other (generalized)


   Quality:  other (shortness of breath)


   Timing:  constant


   Associated Symptoms:  + fevers, + cough, + SOB





Review of Systems


ROS limited secondary to distress and dyspnea.





Past Medical & Surgical


Medical Problems:


(1) Acute kidney injury


(2) Ankle pain


(3) Asthma


(4) Asthma attack


(5) Electrolyte abnormality


(6) Headache


(7) Hypoxia


(8) Infected sebaceous cyst


(9) IUGR (intrauterine growth restriction) affecting care of mother


(10) PNA, asthma exac, tachy cardia


(11) Pregnancy


(12) Pregnancy


(13) Pregnancy


(14) Pregnancy


(15) Pregnancy, supervision, high-risk


(16) Status asthmaticus


Surgical Problems:


(1) Hx of tonsillectomy


(2) [place] teeth extracted








Family History





FH: pulmonary embolism





Social History


Smoking Status:  Current Every Day Smoker


Alcohol Use:  none


Drug Use:  none


Marital Status:  in relationship


Housing Status:  lives with friends


Occupation Status:  unemployed





Current/Historical Medications


Scheduled


Albuterol Hfa (Ventolin Hfa), 2 PUFFS INH Q6H


Ipratropium-Albuterol (Duoneb), 1 TREATMENT INH Q4H


Mometasone Furoate-Formoterol (Dulera 100/5 Mcg), 2 PUFFS INH BID


Montelukast Sodium (Singulair), 1 TAB PO HS





Allergies


Coded Allergies:  


     No Known Allergies (Verified , 1/29/18)





Physical Exam


Vital Signs











  Date Time  Temp Pulse Resp B/P (MAP) Pulse Ox O2 Delivery O2 Flow Rate FiO2


 


1/29/18 11:30  128 28 109/48 93   


 


1/29/18 11:16    91/45    


 


1/29/18 11:15  131 30 76/58 99   


 


1/29/18 11:11    96/46    


 


1/29/18 11:00  133 30 75/48 98   


 


1/29/18 10:45  137 26 105/50 97   


 


1/29/18 10:37    78/56    


 


1/29/18 10:30  141 32 90/47 99 BiPAP  50





    95/46    


 


1/29/18 10:05  140   94   50


 


1/29/18 10:05  140 28  94 BiPAP/CPAP  50


 


1/29/18 10:00     86 Room Air  


 


1/29/18 09:40  164      


 


1/29/18 09:37 38.3 166 40 114/58 94 Nebulizer 9.0 


 


1/29/18 09:37     94   











Physical Exam


GENERAL: Patient is in significant distress secondary to dyspnea. 


HEENT: No acute trauma, normocephalic atraumatic, mucous membranes moist, no 

nasal congestion, no scleral icterus.


NECK: No stridor, no adenopathy, no meningismus, trachea is midline.


LUNGS: Increased respiratory rate, accessory muscle use, speaks in one word 

sentences only, equal breath sounds with bilateral wheezing, breath sounds 

diminished bilaterally. 


HEART: Tachycardic with regular rhythm, no murmurs.


ABDOMEN: Soft, nontender, bowel sounds positive, no hernias, no peritonitis.


EXTREMITIES: No cyanosis or edema, full range of motion of all the joints 

without pain or difficulty, no signs for acute trauma.


NEUROLOGIC: Oriented x 3, no acute motor or sensory deficits, no focal weakness.


SKIN: No rash, no jaundice, no diaphoresis.





Medical Decision & Procedures


ER Provider


Diagnostic Interpretation:


Radiology results as stated below per my review and radiologist interpretation:





CHEST ONE VIEW PORTABLE





FINDINGS:


Cardiomediastinal silhouette normal. Prominence of the left hilum most likely


vascular in etiology. Lungs and pleural spaces clear. Osseous structures normal.


Upper abdomen normal.





IMPRESSION:


1.  Prominence of the left hilum most likely vascular in etiology. No convincing


evidence of acute cardiopulmonary disease.











Electronically signed by:  Reji Zee M.D.





Laboratory Results


1/29/18 10:05








Red Blood Count 4.62, Mean Corpuscular Volume 95.5, Mean Corpuscular Hemoglobin 

32.9, Mean Corpuscular Hemoglobin Concent 34.5, Mean Platelet Volume 9.9, 

Neutrophils (%) (Auto) 91.1, Lymphocytes (%) (Auto) 4.9, Monocytes (%) (Auto) 

3.4, Eosinophils (%) (Auto) 0.0, Basophils (%) (Auto) 0.3, Neutrophils # (Auto) 

6.50, Lymphocytes # (Auto) 0.35, Monocytes # (Auto) 0.24, Eosinophils # (Auto) 

0.00, Basophils # (Auto) 0.02





1/29/18 10:05

















Test


  1/29/18


09:49 1/29/18


10:05 1/29/18


10:08


 


Influenza Type A Antigen


  POS for Influ


A (NEG) 


  


 


 


Influenza Type B Antigen


  Neg for Influ


B (NEG) 


  


 


 


White Blood Count


  


  7.13 K/uL


(4.8-10.8) 


 


 


Red Blood Count


  


  4.62 M/uL


(4.2-5.4) 


 


 


Hemoglobin


  


  15.2 g/dL


(12.0-16.0) 


 


 


Hematocrit  44.1 % (37-47)  


 


Mean Corpuscular Volume


  


  95.5 fL


() 


 


 


Mean Corpuscular Hemoglobin


  


  32.9 pg


(25-34) 


 


 


Mean Corpuscular Hemoglobin


Concent 


  34.5 g/dl


(32-36) 


 


 


Platelet Count


  


  156 K/uL


(130-400) 


 


 


Mean Platelet Volume


  


  9.9 fL


(7.4-10.4) 


 


 


Neutrophils (%) (Auto)  91.1 %  


 


Lymphocytes (%) (Auto)  4.9 %  


 


Monocytes (%) (Auto)  3.4 %  


 


Eosinophils (%) (Auto)  0.0 %  


 


Basophils (%) (Auto)  0.3 %  


 


Neutrophils # (Auto)


  


  6.50 K/uL


(1.4-6.5) 


 


 


Lymphocytes # (Auto)


  


  0.35 K/uL


(1.2-3.4) 


 


 


Monocytes # (Auto)


  


  0.24 K/uL


(0.11-0.59) 


 


 


Eosinophils # (Auto)


  


  0.00 K/uL


(0-0.5) 


 


 


Basophils # (Auto)


  


  0.02 K/uL


(0-0.2) 


 


 


RDW Standard Deviation


  


  43.9 fL


(36.4-46.3) 


 


 


RDW Coefficient of Variation


  


  12.7 %


(11.5-14.5) 


 


 


Immature Granulocyte % (Auto)  0.3 %  


 


Immature Granulocyte # (Auto)


  


  0.02 K/uL


(0.00-0.02) 


 


 


Anion Gap


  


  9.0 mmol/L


(3-11) 


 


 


Est Creatinine Clear Calc


Drug Dose 


  72.6 ml/min 


  


 


 


Estimated GFR (


American) 


  76.0 


  


 


 


Estimated GFR (Non-


American 


  65.6 


  


 


 


BUN/Creatinine Ratio  10.4 (10-20)  


 


Calcium Level


  


  8.5 mg/dl


(8.5-10.1) 


 


 


Magnesium Level


  


  1.7 mg/dl


(1.8-2.4) 


 


 


Total Bilirubin


  


  0.3 mg/dl


(0.2-1) 


 


 


Aspartate Amino Transf


(AST/SGOT) 


  19 U/L (15-37) 


  


 


 


Alanine Aminotransferase


(ALT/SGPT) 


  19 U/L (12-78) 


  


 


 


Alkaline Phosphatase


  


  67 U/L


() 


 


 


Troponin I


  


  < 0.015 ng/ml


(0-0.045) 


 


 


Total Protein


  


  7.6 gm/dl


(6.4-8.2) 


 


 


Albumin


  


  4.0 gm/dl


(3.4-5.0) 


 


 


Globulin


  


  3.6 gm/dl


(2.5-4.0) 


 


 


Albumin/Globulin Ratio  1.1 (0.9-2)  


 


Lactic Acid Level


  


  


  1.5 mmol/L


(0.4-2.0)





Laboratory results reviewed by me.





Medications Administered











 Medications


  (Trade)  Dose


 Ordered  Sig/Yanet


 Route  Start Time


 Stop Time Status Last Admin


Dose Admin


 


 Sodium Chloride  1,000 ml @ 


 999 mls/hr  Q1H1M STAT


 IV  1/29/18 09:46


 1/29/18 10:46 DC 1/29/18 09:59


999 MLS/HR


 


 Ceftriaxone Sodium


  (Rocephin Inj)  1 gm  NOW  STAT


 IV  1/29/18 09:46


 1/29/18 09:50 DC 1/29/18 11:14


1 GM


 


 Acetaminophen


  (Tylenol Tab)  1,000 mg  NOW  STAT


 PO  1/29/18 09:46


 1/29/18 09:50 DC 1/29/18 09:59


1,000 MG


 


 Albuterol/


 Ipratropium


  (Duoneb)  12 ml  NOW  STAT


 INH  1/29/18 09:46


 1/29/18 09:50 DC 1/29/18 10:05


12 ML


 


 Ketorolac


 Tromethamine


  (Toradol Inj)  30 mg  NOW  STAT


 IV  1/29/18 09:46


 1/29/18 09:50 DC 1/29/18 09:59


30 MG


 


 Magnesium Sulfate


  (Magnesium


 Sulfate)  1 gm  STK-MED ONCE


 .ROUTE  1/29/18 09:53


 1/29/18 09:54 DC 1/29/18 09:59


1 GM


 


 Sodium Chloride  1,000 ml @ 


 999 mls/hr  Q1H1M STAT


 IV  1/29/18 10:36


 1/29/18 11:36 DC 1/29/18 10:39


999 MLS/HR


 


 Morphine Sulfate


  (MoRPHine


 SULFATE INJ)  2 mg  Q2H  PRN


 IV  1/29/18 11:30


 2/12/18 11:29  1/29/18 15:30


2 MG











ED Course


0943: The patient was evaluated in room B3B. A complete history and physical 

exam was performed.





0946: Ordered Sodium Chloride 1000 ml @ 200 mls/hr IV, Toradol Inj 30 mg IV, 

Duoneb 12 ml INH, Tylenol Tab 1000 mg PO, Rocephine Inj 1 gm IV, Sodium 

Chloride 1000 ml @ 999 mls/hr IV. 





0953: Ordered Magnesium Sulfate 1 gm IV. 





1036: Ordered Tamiflu Cap 75 mg PO, Sodium Chloride 1000 ml @ 999 mls/hr IV. 





1046: Discussed the patient's case with Dr. Ross [facility] Intensivist. 





1052: [facility] [place] The patient will be 

evaluated for further management. 





1118: The patient looks better and feels better. I updated her on the treatment 

plan. She is agreeable to come into the hospital.





Medical Decision


Differential diagnoses: respiratory distress, exacerbation of asthma, bronchitis

, pneumonia, influenza, dehydration, electrolyte imbalance.





There is no leukocytosis or concerning anemia.  Magnesium slightly low, no 

kidney failure.  No hepatitis.  Lactic acid level is not elevated making sepsis 

less likely.  Blood cultures are pending.  Chest film does not show pneumonia 

or CHF, no pneumothorax.  Influenza testing is positive for influenza A.





The patient presents in respiratory distress.  She was aggressively managed.  

She was placed on BiPAP with a 1 hour DuoNeb.  She was given IV magnesium, IV 

Toradol, IV ceftriaxone and IV saline.  She was ordered for oral Tylenol and 

oral Tamiflu.





The patient has made significant improvement with the above treatment.  She is 

breathing easier, her heart rate and respiratory rate have improved.





The patient has acute influenza with an exacerbation of asthma.  She requires a 

hospital stay.  I did speak with the intensivist on call, I talked with the on-

call hospitalist.  Case management has been involved.





Medication Reconcilliation


Current Medication List:  was personally reviewed by me





Blood Pressure Screening


Patient's blood pressure:  Low blood pressure





Consults


Time Called:  1040


Consulting Physician:  Dr. Cody PHELPS Intensivist


Returned Call:  1046


Discussed the patient's case.


Additional Consults:  


   Time Called:  1049


   Consulted Physician:  Dr. Walter


   Returned Call:  1052


Additional Comments:


Discussed the patient's case with Dr. Walter. The patient will be evaluated 

for further management.





Impression





 Primary Impression:  


 Respiratory distress


 Additional Impressions:  


 Influenza A


 Hypotension


 Tachycardia


 Asthma exacerbation





Critical Care


I have personally spent greater than 35 minutes of critical care time in the 

direct management of this patient.  This includes bedside care, interpretation 

of diagnostic studies, and testing, discussion with consultants, patient, and 

family members, and other required patient management activities.  This 35 

minutes is in excess of all separately billable procedures.





Scribe Attestation


The scribe's documentation has been prepared under my direction and personally 

reviewed by me in its entirety. I confirm that the note above accurately 

reflects all work, treatment, procedures, and medical decision making performed 

by me.





Departure Information


Dispostion


Being Evaluated By Hospitalist





Referrals


Lakeisha Terry, RADHAR.N.P (PCP)





Patient Instructions


My [facility]





Problem Qualifiers

## 2018-01-29 NOTE — HISTORY AND PHYSICAL
History & Physical


Date & Time of Service:


Jan 29, 2018 at 11:35


Chief Complaint:


Respiratory


Primary Care Physician:


Lakeisha Terry C.R.N.P


History of Present Illness


Source:  patient


28yo female with severe, persistent asthma & tobacco dependence - known to me 

from asthma exacerbation requiring hospitalization in early Dec 2017 - who 

presents with severe cough, wheezing, dyspnea, chest tightness, fevers and 

chills beginning late last pm.  She used her albuterol inhaler multiple times 

overnight without relief of chest symptoms.  She reports no obvious sick 

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 

year.  Has a young son, age 3, but he has been out of town in [place] and just 

arrived home late last night.  Her respiratory symptoms became so severe this 

AM that she dialed 911 and summoned EMS to her home.  En route to [facility] 

her O2 sats were mid 90s on supplemental O2 but she was in significant distress

; thus, duoneb was given as well as 125mg of solumedrol.  Once in the ER at [facility] IV magnesium was administered along with an hour-long duoneb & BIPAP.  

She apparently could only provide one-word answers for the ER attending.  Upon 

my admission assessment, however, she was feeling modestly improved and able to 

provide more history.  None-the-less she still c/o significant dyspnea & chest 

tightness.  Rapid flu testing was also positive for flu A.  





Patient reports she did not establish care with [facility] as was 

recommended after her last hospital d/c.  


She left AMA from that hospital stay.  


She reports compliance with her controller agents (dulera & singulair).


Denies any recent illnesses or ER visits for her asthma.





Past Medical/Surgical History


PMH:


1.  severe, persistent asthma


2.  tobacco dependence


3.  noncompliance





PSH: 


(1) Hx of tonsillectomy


Status: Resolved  





(2) [place] teeth extracted


Status: Resolved  








Family History





FH: pulmonary embolism


mother, father - neither have asthma


siblings - no asthma


mother/father both healthy to her recollection





Social History


has 1 son


Smoking Status:  Current Every Day Smoker (at least 1/2 ppd to 1 ppd)


Smokeless Tobacco Use:  No


Alcohol Use:  socially


Drug Use:  none


Marital Status:  in relationship


Housing status:  lives with significant other


Occupational Status:  employed (nurses aid)





Immunizations


History of Influenza Vaccine:  Unknown


History of Tetanus Vaccine?:  Unknown


History of Pneumococcal:  Unknown


History of Hepatitis B Vaccine:  Unknown





Multi-Drug Resistant Organisms


History of MDRO:  No





Allergies


Coded Allergies:  


     No Known Allergies (Verified , 1/29/18)





Home Medications


Scheduled


Albuterol Hfa (Ventolin Hfa), 2 PUFFS INH Q6H


Ipratropium-Albuterol (Duoneb), 1 TREATMENT INH Q4H


Mometasone Furoate-Formoterol (Dulera 100/5 Mcg), 2 PUFFS INH BID


Montelukast Sodium (Singulair), 1 TAB PO HS





Review of Systems


Constitutional:  + fever, + chills, + fatigue


Eyes:  No worsening of vision


ENT:  + sore throat, No nasal symptoms, No trouble swallowing


Respiratory:  + cough, + wheezing, + shortness of breath, + dyspnea on exertion

, + dyspnea at rest, No sputum, No hemoptysis


Cardiovascular:  + chest pain, + orthopnea, No PND, No edema


Abdomen:  No pain, No nausea, No vomiting, No diarrhea, No constipation, No GI 

bleeding


Musculoskeletal:  + problem reported (back pain), No joint pain, No muscle pain


Genitourinary - Female:  No dysuria


Neurologic:  No paralysis, No numbness/tingling


Psychiatric:  No depression symptoms


Endocrine:  + fatigue


Hematologic / Lymphatic:  No abnormal bleeding/bruising


Integumentary:  No rash





Physical Exam


Vital Signs











  Date Time  Temp Pulse Resp B/P (MAP) Pulse Ox O2 Delivery O2 Flow Rate FiO2


 


1/29/18 10:37    78/56    


 


1/29/18 10:30  141 32 90/47 99 BiPAP  50





    95/46    


 


1/29/18 10:05  140   94   50


 


1/29/18 10:05  140 28  94 BiPAP/CPAP  50


 


1/29/18 10:00     86 Room Air  


 


1/29/18 09:40  164      


 


1/29/18 09:37 38.3 166 40 114/58 94 Nebulizer 9.0 


 


1/29/18 09:37     94   








General Appearance:  + moderate distress (tachypnea, mild retractions, able to 

speak but only in broken sentences, visibly dyspneic)


Head:  normocephalic, atraumatic


Eyes:  PERRL (pupils dilated), sclerae normal


ENT:  TMs normal, pharynx normal, + pertinent finding (tongue piercing)


Neck:  supple, no adenopathy, thyroid normal, no JVD


Respiratory/Chest:  + respiratory distress, + accessory muscle use, + wheezing (

b/l - insp/exp - extensive), + pertinent finding (airation significantly 

improved by report)


Cardiovascular:  no gallop, no murmur, normal peripheral pulses, + tachycardia


Abdomen/GI:  normal bowel sounds, non tender, soft, no organomegaly


Back:  normal inspection, no muscle spasm


Extremities/Musculoskelatal:  normal capillary refill, no pedal edema


Neurologic/Psych:  no motor/sensory deficits (strength 5/5 x 4 exts), alert, 

normal reflexes, oriented x 3


Skin:  no rash


Lymphatic:  no adenopathy (no cervical LAD)





Diagnostics


Laboratory Results





Results Past 24 Hours








Test


  1/29/18


09:49 1/29/18


10:05 1/29/18


10:08 1/29/18


10:48 Range/Units


 


 


Influenza Type A Antigen POS for Influ A    NEG  


 


Influenza Type B Antigen Neg for Influ B    NEG  


 


White Blood Count  7.13   4.8-10.8  K/uL


 


Red Blood Count  4.62   4.2-5.4  M/uL


 


Hemoglobin  15.2   12.0-16.0  g/dL


 


Hematocrit  44.1   37-47  %


 


Mean Corpuscular Volume  95.5     fL


 


Mean Corpuscular Hemoglobin  32.9   25-34  pg


 


Mean Corpuscular Hemoglobin


Concent 


  34.5


  


  


  32-36  g/dl


 


 


Platelet Count  156   130-400  K/uL


 


Mean Platelet Volume  9.9   7.4-10.4  fL


 


Neutrophils (%) (Auto)  91.1    %


 


Lymphocytes (%) (Auto)  4.9    %


 


Monocytes (%) (Auto)  3.4    %


 


Eosinophils (%) (Auto)  0.0    %


 


Basophils (%) (Auto)  0.3    %


 


Neutrophils # (Auto)  6.50   1.4-6.5  K/uL


 


Lymphocytes # (Auto)  0.35   1.2-3.4  K/uL


 


Monocytes # (Auto)  0.24   0.11-0.59  K/uL


 


Eosinophils # (Auto)  0.00   0-0.5  K/uL


 


Basophils # (Auto)  0.02   0-0.2  K/uL


 


RDW Standard Deviation  43.9   36.4-46.3  fL


 


RDW Coefficient of Variation  12.7   11.5-14.5  %


 


Immature Granulocyte % (Auto)  0.3    %


 


Immature Granulocyte # (Auto)  0.02   0.00-0.02  K/uL


 


Sodium Level  136   136-145  mmol/L


 


Potassium Level  3.4   3.5-5.1  mmol/L


 


Chloride Level  105     mmol/L


 


Carbon Dioxide Level  22   21-32  mmol/L


 


Anion Gap  9.0   3-11  mmol/L


 


Blood Urea Nitrogen  12   7-18  mg/dl


 


Creatinine


  


  1.15


  


  


  0.60-1.20


mg/dl


 


Est Creatinine Clear Calc


Drug Dose 


  72.6


  


  


   ml/min


 


 


Estimated GFR (


American) 


  76.0


  


  


   


 


 


Estimated GFR (Non-


American 


  65.6


  


  


   


 


 


BUN/Creatinine Ratio  10.4   10-20  


 


Random Glucose  123   70-99  mg/dl


 


Calcium Level  8.5   8.5-10.1  mg/dl


 


Magnesium Level  1.7   1.8-2.4  mg/dl


 


Total Bilirubin  0.3   0.2-1  mg/dl


 


Aspartate Amino Transf


(AST/SGOT) 


  19


  


  


  15-37  U/L


 


 


Alanine Aminotransferase


(ALT/SGPT) 


  19


  


  


  12-78  U/L


 


 


Alkaline Phosphatase  67     U/L


 


Troponin I  < 0.015   0-0.045  ng/ml


 


Total Protein  7.6   6.4-8.2  gm/dl


 


Albumin  4.0   3.4-5.0  gm/dl


 


Globulin  3.6   2.5-4.0  gm/dl


 


Albumin/Globulin Ratio  1.1   0.9-2  


 


Lactic Acid Level   1.5  0.4-2.0  mmol/L








Microbiology Results


1/29/18 Blood Culture, Received


          Pending


1/29/18 Blood Culture, Received


          Pending





Diagnostic Radiology


cxr -


IMPRESSION:


1.  Prominence of the left hilum most likely vascular in etiology. No convincing


evidence of acute cardiopulmonary disease.





EKG


EKG - my reading -


sinus tach


RSR' pattern anterior leads


borderline QTc prolongation


inferior T wave inversions





Impression


Assessment and Plan


28yo female with severe, persistent asthma; noncompliance; tobacco dependence - 

presenting with acute hypoxic respiratory failure 2nd to influenza type A 

infection and asthma exacerbation.  No radiographic or clinical evidence of 

bacterial pneumonia at this time.





1.  acute hypoxic resp failure 2nd to influenza type A infection & severe 

asthma exacerbation - 


* admit to ICU


* continue BIPAP


* await VBG


* solumedrol 60mg IV q6h


* duonebs q4h and albuterol q2h prn


* continue singulair & will substitute symbicort for dulera


* tamiflu x 5 days


* no indication for antibiotics at this time 


* droplet precautions 


* pulmonary toilet when able 





2.  tobacco dependence -  to quit.  Nicoderm patch 14mg/day. 





3.  severe, persistent asthma at baseline, with concurrent exacerbation - 


* after last hospital stay she was provided with pulmonary consultation in the 

pulmonary office


* she did not attend this visit


* will need to establish care with pulmonary for PFTs, etc.  


* continue outpatient controller meds


* solumedrol as above





4.  DVT proph - lovenox 40mg daily. 





5.  check urine HCG





6.  hypomagnesemia - given IV magnesium in the ER; repeat level in the AM. 





7.  hypotension - cannot rule out early sepsis from influenza.  BIPAP may have 

contributed to low BP.


It is improved s/p multiple fluid boluses in the ER.


Cont copious hydration.





8.  hypokalemia - replace with IV KCL in maintenance fluids.  Also will provide 

a K-rider now. 





9.  borderline QTc prolongation - 2nd to low K, low mag.  Telemetry, repeat EKG 

tomorrow. 





10.  FEN - NPO while on BIPAP; IVF as above; replete low K/mag.  


Lytes in AM including phos. 








care d/w YORDY Tee & Dr. Fitzgerald, ICU attending





Level of Care


Critical Care





Advanced Directives


Existing Advance Directive:  No





Resuscitation Status


FULL RESUSCITATION





VTE Prophylaxis


VTE Risk Assessment Done? Y/N:  Yes


Risk Level:  Moderate


Given or contraindicated:  Enoxaparin (Lovenox)SQ





Note


Total Time:   Critical Care 30 - 74 minutes

## 2018-01-29 NOTE — CRITICAL CARE CONSULTATION
Critical Care Consultation


Date of Consultation:


Jan 29, 2018.


Attending Physician:


Todd Hoang MD


Reason for Consultation:


Hypoxic respiratory failure secondary to influenza A


History of Present Illness


Intensivist: Dr. Fitzgerald





27-year-old  female recently admitted for asthma exacerbation in 

December. At that time she left [place]. She is reported to have persistent asthma 

that has been poorly treated. She denies any prior history of endotracheal 

intubation and mechanical ventilation. She is a daily tobacco abuser of 1/2-1 

pack per day. She reports that she was in her usual state of health up until 

yesterday when she began to have flulike symptoms and increased wheezes. She 

reports that she used her inhalers but had no improvement. She denies any fever

, chills, sweats, rigors. She has been around no sick contacts. She does have a 

3-year-old child but the child is currently in [place]. The patient was sick 

enough this morning that she called 911 and on the way into the emergency 

department was given nebulizer treatments and Solu-Medrol. On arrival to the ER 

she continued to have signs of respiratory failure. She was placed on BiPAP and 

seemed to tolerate that well. She also received hour-long neb with magnesium 

infusion. She was found to have low magnesium on arrival. The patient had a 

antigen test for influenza and was found to be positive influenza type a. She 

is negative for influenza type B. Subtype of influenza has not been identified 

at this time. She was started on Tamiflu and will be treated with twice a day 

for five days. Patient was empirically placed on droplet precautions. She 

continues with Solu-Medrol as well as DuoNeb treatments. In the intensive care 

unit the patient states that she has some improvement but still complains of 

dyspnea and bronchospasm.





Past Medical/Surgical History


Medical Problems:


Ankle pain


Asthma/Status asthmaticus


Headache


Hypoxia


Infected sebaceous cyst


IUGR (intrauterine growth restriction) affecting care of mother


PNA, asthma exac, tachy cardia


Pregnancy, supervision, high-risk





Surgical Problems:


Hx of tonsillectomy


[place] teeth extracted





Family History





FH: pulmonary embolism





Social History


Smoking Status:  Current Every Day Smoker (at least 1/2 ppd to 1 ppd)


Smokeless Tobacco Use:  No


Alcohol Use:  socially


Drug Use:  none


Marital Status:  in relationship


Housing Status:  lives with friends


Occupation Status:  employed (Private duty nurses aid)





Allergies


Coded Allergies:  


     No Known Allergies (Verified , 1/29/18)





Home Medications


Scheduled


Albuterol Hfa (Ventolin Hfa), 2 PUFFS INH Q6H


Ipratropium-Albuterol (Duoneb), 1 TREATMENT INH Q4H


Mometasone Furoate-Formoterol (Dulera 100/5 Mcg), 2 PUFFS INH BID


Montelukast Sodium (Singulair), 1 TAB PO HS





Current Inpatient Medications





Current Inpatient Medications








 Medications


  (Trade)  Dose


 Ordered  Sig/Yanet


 Route  Start Time


 Stop Time Status Last Admin


Dose Admin


 


 Enoxaparin Sodium


  (Lovenox Inj)  40 mg  Q24H


 SQ  1/29/18 21:00


 2/28/18 20:59   


 


 


 Potassium


 Chloride/Sodium


 Chloride  1,000 ml @ 


 150 mls/hr  Q6H40M


 IV  1/29/18 13:00


 2/28/18 12:59  1/29/18 12:40


150 MLS/HR


 


 Acetaminophen


  (Tylenol Tab)  650 mg  Q4H  PRN


 PO  1/29/18 11:30


 2/28/18 11:29   


 


 


 Al Hydrox/Mg


 Hydrox/Simethicone


  (Maalox Max Susp)  15 ml  Q4H  PRN


 PO  1/29/18 11:30


 2/28/18 11:29   


 


 


 Magnesium


 Hydroxide


  (Milk Of


 Magnesia Susp)  30 ml  Q12H  PRN


 PO  1/29/18 11:30


 2/28/18 11:29   


 


 


 Ondansetron HCl


  (Zofran Inj)  4 mg  Q6H  PRN


 IV  1/29/18 11:30


 2/28/18 11:29   


 


 


 Ranitidine HCl


  (zANTac TAB)  150 mg  BID


 PO  1/29/18 21:00


 2/28/18 20:59   


 


 


 Morphine Sulfate


  (MoRPHine


 SULFATE INJ)  2 mg  Q2H  PRN


 IV  1/29/18 11:30


 2/12/18 11:29   


 


 


 Albuterol/


 Ipratropium


  (Duoneb)  3 ml  Q4R


 INH  1/29/18 12:00


 2/28/18 11:59   


 


 


 Miscellaneous


 Information


  (Icu Protocol


 For Hyperglycemia)  1 ea  PRN  PRN


 N/A  1/29/18 11:30


 1/31/18 11:29   


 


 


 Montelukast Sodium


  (Singulair Tab)  10 mg  HS


 PO  1/29/18 21:00


 2/28/18 20:59   


 


 


 Budesonide/


 Formoterol


 Fumarate


  (Symbicort 160/


 4.5 Inh)  2 puffs  BID


 INH  1/29/18 21:00


 2/28/18 20:59   


 


 


 Methylprednisolone


 Sodium Succinate


 60 mg/Syringe  0.96 ml @ 


 1.5 mls/min  Q6H


 IV  1/29/18 14:00


 2/28/18 13:59   


 


 


 Oseltamivir


 Phosphate


  (Tamiflu Cap)  75 mg  BID


 PO  1/29/18 21:00


 2/3/18 20:59   


 


 


 Nicotine


  (Nicoderm Cq


 14MG Patch)  1 patch  QAM


 TD  1/29/18 13:00


 2/28/18 12:59   


 


 


 Miscellaneous


  (Remove Nicoderm


 Patch)  1 ea  HS


 N/A  1/29/18 21:00


 2/28/18 20:59   


 


 


 Albuterol Sulfate


  (Ventolin 0.083%


 2.5MG/3ML Neb)  2.5 mg  Q2H  PRN


 INH  1/29/18 11:30


 2/28/18 11:29   


 


 


 Potassium


 Chloride 10 meq/


 Prmx  100 ml @ 


 100 mls/hr  Q1H


 IV  1/29/18 13:00


 1/29/18 14:59   


 











Review of Systems


A total of 12 systems was reviewed and is negative other than as listed above 

in the HPI





Physical Exam











  Date Time  Temp Pulse Resp B/P (MAP) Pulse Ox O2 Delivery O2 Flow Rate FiO2


 


1/29/18 11:30  128 28 109/48 93   


 


1/29/18 11:16    91/45    


 


1/29/18 11:15  131 30 76/58 99   


 


1/29/18 11:11    96/46    


 


1/29/18 11:00  133 30 75/48 98   


 


1/29/18 10:45  137 26 105/50 97   


 


1/29/18 10:37    78/56    


 


1/29/18 10:30  141 32 90/47 99 BiPAP  50





    95/46    


 


1/29/18 10:05  140   94   50


 


1/29/18 10:05  140 28  94 BiPAP/CPAP  50


 


1/29/18 10:00     86 Room Air  


 


1/29/18 09:40  164      


 


1/29/18 09:37 38.3 166 40 114/58 94 Nebulizer 9.0 


 


1/29/18 09:37     94   











Physical Exam:


General - NAD


Eyes - No icterus, gaze conjugate


ENT - Mucosa moist, no lesions or candidiasis


Neck - Supple, No JVD


Lungs - No rales or rhonchi. Diffuse bronchospasm


Heart - Regular, tachycardic


Abdomen - Soft, NT, ND, BS present 


Extremities - No edema, pedal pulses intact 


Neuro - A&OX3








Laboratory Results





Last 24 Hours








Test


  1/29/18


09:49 1/29/18


10:05 1/29/18


10:08 1/29/18


12:11


 


Influenza Type A Antigen


  POS for Influ


A 


  


  


 


 


Influenza Type B Antigen


  Neg for Influ


B 


  


  


 


 


White Blood Count  7.13 K/uL   


 


Red Blood Count  4.62 M/uL   


 


Hemoglobin  15.2 g/dL   


 


Hematocrit  44.1 %   


 


Mean Corpuscular Volume  95.5 fL   


 


Mean Corpuscular Hemoglobin  32.9 pg   


 


Mean Corpuscular Hemoglobin


Concent 


  34.5 g/dl 


  


  


 


 


Platelet Count  156 K/uL   


 


Mean Platelet Volume  9.9 fL   


 


Neutrophils (%) (Auto)  91.1 %   


 


Lymphocytes (%) (Auto)  4.9 %   


 


Monocytes (%) (Auto)  3.4 %   


 


Eosinophils (%) (Auto)  0.0 %   


 


Basophils (%) (Auto)  0.3 %   


 


Neutrophils # (Auto)  6.50 K/uL   


 


Lymphocytes # (Auto)  0.35 K/uL   


 


Monocytes # (Auto)  0.24 K/uL   


 


Eosinophils # (Auto)  0.00 K/uL   


 


Basophils # (Auto)  0.02 K/uL   


 


RDW Standard Deviation  43.9 fL   


 


RDW Coefficient of Variation  12.7 %   


 


Immature Granulocyte % (Auto)  0.3 %   


 


Immature Granulocyte # (Auto)  0.02 K/uL   


 


Sodium Level  136 mmol/L   


 


Potassium Level  3.4 mmol/L   


 


Chloride Level  105 mmol/L   


 


Carbon Dioxide Level  22 mmol/L   


 


Anion Gap  9.0 mmol/L   


 


Blood Urea Nitrogen  12 mg/dl   


 


Creatinine  1.15 mg/dl   


 


Est Creatinine Clear Calc


Drug Dose 


  72.6 ml/min 


  


  


 


 


Estimated GFR (


American) 


  76.0 


  


  


 


 


Estimated GFR (Non-


American 


  65.6 


  


  


 


 


BUN/Creatinine Ratio  10.4   


 


Random Glucose  123 mg/dl   


 


Calcium Level  8.5 mg/dl   


 


Magnesium Level  1.7 mg/dl   


 


Total Bilirubin  0.3 mg/dl   


 


Aspartate Amino Transf


(AST/SGOT) 


  19 U/L 


  


  


 


 


Alanine Aminotransferase


(ALT/SGPT) 


  19 U/L 


  


  


 


 


Alkaline Phosphatase  67 U/L   


 


Troponin I  < 0.015 ng/ml   


 


Total Protein  7.6 gm/dl   


 


Albumin  4.0 gm/dl   


 


Globulin  3.6 gm/dl   


 


Albumin/Globulin Ratio  1.1   


 


Lactic Acid Level   1.5 mmol/L  


 


Venous Blood pH    7.32 


 


Venous Blood Partial Pressure


CO2 


  


  


  39 mmHg 


 


 


Venous Blood Partial Pressure


O2 


  


  


  43 mmHg 


 


 


Venous Blood HCO3    20 mmol/L 


 


Venous Blood Oxygen Saturation    74.0 % 


 


Venous Blood Base Excess    -5.9 mEq/L 











Diagnostic Results


CHEST ONE VIEW PORTABLE





CLINICAL HISTORY: 27 years-old Female presenting with EVALUATE RESPIRATORY


DISTRESS.DYSPNEA. 





TECHNIQUE: Portable upright AP view of the chest was obtained.





COMPARISON: 12/1/2017.





FINDINGS:


Cardiomediastinal silhouette normal. Prominence of the left hilum most likely


vascular in etiology. Lungs and pleural spaces clear. Osseous structures normal.


Upper abdomen normal.





IMPRESSION:


1.  Prominence of the left hilum most likely vascular in etiology. No convincing


evidence of acute cardiopulmonary disease.











Electronically signed by:  Reji Zee M.D.


1/29/2018 10:11 AM





Assessment & Plan





(1) Influenza A


(2) Acute asthma exacerbation


(3) Respiratory distress


(4) Hypoxia


(5) Acute kidney injury


(6) Electrolyte abnormality


(7) Hypotension


PULMONARY


* Patient with influenza A - five days of Tamiflu twice a day. Supportive care


* Asthma exacerbation - persistent asthma as an outpatient with recent 

admission in December. Continue with DuoNeb treatment and BiPAP when necessary


* Tobacco abuse - ongoing daily tobacco abuse with one half to one pack per 

day. NicoDerm transdermal patch. Discussed the need for abstinence with her 

history of asthma


* Follow-up outpatient pulmonary appointment and missed in December and 

January. Encouraged patient to follow-up with outpatient office for better 

control of her persistent severe asthma





CARDIOVASCULAR


* Hypotension on presentation to the emergency department


* IV fluids


* Blood pressure now 108/68


* Continues with minimal tachycardia which is improved with improving blood 

pressure


* No chest pain or tightness


* EKG sinus tachycardia with possible left atrial enlargement. Her EKG with 

inverted T waves now replaced nonspecific T-wave abnormalities in inferior leads


* QTc 456


* Repeat EKG once patient is hemodynamically stable





RENAL


* Creatinine 1.15. Baseline of 0.89


* Continue IV hydration


* Follow serial labs


* Watch urine output





ELECTROLYTE ABNORMALITY


* Potassium 3.4 on admission


* Magnesium 1.7 on admission


* Potassium and magnesium repleted in the ER


* Follow serial labs and replete as necessary





ID


* Positive for influenza A - continue Tamiflu and supportive care


* Ceftriaxone received in the emergency department


* Chest x-ray with no clear infiltrate or consolidation


* Lactic acid 1.5


* WBC 7.13


* Follow serial labs





ENDOCRINE


* No history of diabetes mellitus or hypothyroidism


* Follow BSG per ICU protocol





NEURO


* Alert and oriented X 3





IV ACCESS


* Peripheral IVs in place


* No indication for central line at this time





GI


* No prior history of GERD


* No indication for PPI at this time. However, if patient requires intubation 

and then start PPI daily








* No  complaints


* Patient making adequate urine at this time





DVT PROPHYLAXIS


* Enoxaparin


* TEDs/SCDs





CCT: 45 minutes independent of any procedures. This includes time spent in the 

emergency department evaluating the patient.





Thank you for including us in the care of this patient. Please refer to Dr. Fitzgerald's addendum for further recommendations.





I have personally evaluated and examined this patient.  I agree with assessment 

and plan of ALICIA Moran PA-C.





Patient slightly improved with hour-long bronchodilators.  Still requiring 

noninvasive ventilatory support.  Will optimize electrolytes.  She was able to 

make an 4-5 word sentences which is improved from her one-word sentences upon 

arrival in the emergency department.  Patient still remains critically ill

## 2018-01-30 VITALS
TEMPERATURE: 98.24 F | SYSTOLIC BLOOD PRESSURE: 134 MMHG | DIASTOLIC BLOOD PRESSURE: 85 MMHG | HEART RATE: 117 BPM | OXYGEN SATURATION: 94 %

## 2018-01-30 VITALS — OXYGEN SATURATION: 91 % | HEART RATE: 98 BPM

## 2018-01-30 VITALS — SYSTOLIC BLOOD PRESSURE: 122 MMHG | DIASTOLIC BLOOD PRESSURE: 64 MMHG | HEART RATE: 77 BPM | OXYGEN SATURATION: 94 %

## 2018-01-30 VITALS
DIASTOLIC BLOOD PRESSURE: 69 MMHG | HEART RATE: 96 BPM | SYSTOLIC BLOOD PRESSURE: 109 MMHG | TEMPERATURE: 98.6 F | OXYGEN SATURATION: 96 %

## 2018-01-30 VITALS
SYSTOLIC BLOOD PRESSURE: 114 MMHG | DIASTOLIC BLOOD PRESSURE: 67 MMHG | TEMPERATURE: 98.96 F | OXYGEN SATURATION: 95 % | HEART RATE: 86 BPM

## 2018-01-30 VITALS
OXYGEN SATURATION: 93 % | TEMPERATURE: 98.42 F | SYSTOLIC BLOOD PRESSURE: 110 MMHG | DIASTOLIC BLOOD PRESSURE: 72 MMHG | HEART RATE: 102 BPM

## 2018-01-30 VITALS
TEMPERATURE: 98.78 F | OXYGEN SATURATION: 96 % | SYSTOLIC BLOOD PRESSURE: 126 MMHG | HEART RATE: 102 BPM | DIASTOLIC BLOOD PRESSURE: 82 MMHG

## 2018-01-30 VITALS — DIASTOLIC BLOOD PRESSURE: 86 MMHG | SYSTOLIC BLOOD PRESSURE: 125 MMHG | HEART RATE: 98 BPM | OXYGEN SATURATION: 95 %

## 2018-01-30 VITALS — HEART RATE: 87 BPM | OXYGEN SATURATION: 95 %

## 2018-01-30 VITALS
OXYGEN SATURATION: 97 % | HEART RATE: 104 BPM | DIASTOLIC BLOOD PRESSURE: 50 MMHG | TEMPERATURE: 98.24 F | SYSTOLIC BLOOD PRESSURE: 92 MMHG

## 2018-01-30 VITALS
TEMPERATURE: 98.6 F | HEART RATE: 94 BPM | OXYGEN SATURATION: 94 % | DIASTOLIC BLOOD PRESSURE: 86 MMHG | SYSTOLIC BLOOD PRESSURE: 123 MMHG

## 2018-01-30 VITALS — OXYGEN SATURATION: 95 % | HEART RATE: 87 BPM

## 2018-01-30 VITALS
OXYGEN SATURATION: 93 % | DIASTOLIC BLOOD PRESSURE: 82 MMHG | TEMPERATURE: 98.42 F | HEART RATE: 88 BPM | SYSTOLIC BLOOD PRESSURE: 138 MMHG

## 2018-01-30 VITALS — OXYGEN SATURATION: 94 %

## 2018-01-30 VITALS — HEART RATE: 110 BPM | OXYGEN SATURATION: 92 %

## 2018-01-30 VITALS — HEART RATE: 94 BPM | OXYGEN SATURATION: 94 %

## 2018-01-30 VITALS
SYSTOLIC BLOOD PRESSURE: 104 MMHG | DIASTOLIC BLOOD PRESSURE: 50 MMHG | TEMPERATURE: 98.42 F | OXYGEN SATURATION: 95 % | HEART RATE: 91 BPM

## 2018-01-30 VITALS — OXYGEN SATURATION: 91 % | HEART RATE: 93 BPM

## 2018-01-30 VITALS — OXYGEN SATURATION: 95 % | HEART RATE: 85 BPM

## 2018-01-30 LAB
BASOPHILS # BLD: 0 K/UL (ref 0–0.2)
BASOPHILS NFR BLD: 0 %
BUN SERPL-MCNC: 8 MG/DL (ref 7–18)
CALCIUM SERPL-MCNC: 8.8 MG/DL (ref 8.5–10.1)
CO2 SERPL-SCNC: 21 MMOL/L (ref 21–32)
CREAT SERPL-MCNC: 0.67 MG/DL (ref 0.6–1.2)
EOS ABS #: 0 K/UL (ref 0–0.5)
EOSINOPHIL NFR BLD AUTO: 148 K/UL (ref 130–400)
GLUCOSE SERPL-MCNC: 121 MG/DL (ref 70–99)
HCT VFR BLD CALC: 42.5 % (ref 37–47)
HGB BLD-MCNC: 14.7 G/DL (ref 12–16)
IG#: 0.03 K/UL (ref 0–0.02)
IMM GRANULOCYTES NFR BLD AUTO: 5.5 %
LYMPHOCYTES # BLD: 0.56 K/UL (ref 1.2–3.4)
MCH RBC QN AUTO: 33 PG (ref 25–34)
MCHC RBC AUTO-ENTMCNC: 34.6 G/DL (ref 32–36)
MCV RBC AUTO: 95.5 FL (ref 80–100)
MONO ABS #: 0.38 K/UL (ref 0.11–0.59)
MONOCYTES NFR BLD: 3.7 %
NEUT ABS #: 9.24 K/UL (ref 1.4–6.5)
NEUTROPHILS # BLD AUTO: 0 %
NEUTROPHILS NFR BLD AUTO: 90.5 %
PHOSPHATE SERPL-MCNC: 3.9 MG/DL (ref 2.5–4.9)
PMV BLD AUTO: 9.9 FL (ref 7.4–10.4)
POTASSIUM SERPL-SCNC: 4.3 MMOL/L (ref 3.5–5.1)
RED CELL DISTRIBUTION WIDTH CV: 12.7 % (ref 11.5–14.5)
RED CELL DISTRIBUTION WIDTH SD: 44.7 FL (ref 36.4–46.3)
SODIUM SERPL-SCNC: 139 MMOL/L (ref 136–145)
WBC # BLD AUTO: 10.21 K/UL (ref 4.8–10.8)

## 2018-01-30 RX ADMIN — MORPHINE SULFATE PRN MG: 2 INJECTION, SOLUTION INTRAMUSCULAR; INTRAVENOUS at 03:35

## 2018-01-30 RX ADMIN — GUAIFENESIN AND CODEINE PHOSPHATE SCH ML: 10; 100 LIQUID ORAL at 20:03

## 2018-01-30 RX ADMIN — BUDESONIDE AND FORMOTEROL FUMARATE DIHYDRATE SCH PUFFS: 160; 4.5 AEROSOL RESPIRATORY (INHALATION) at 09:06

## 2018-01-30 RX ADMIN — RANITIDINE SCH MG: 150 TABLET ORAL at 09:06

## 2018-01-30 RX ADMIN — IPRATROPIUM BROMIDE AND ALBUTEROL SULFATE SCH ML: .5; 3 SOLUTION RESPIRATORY (INHALATION) at 01:00

## 2018-01-30 RX ADMIN — NICOTINE SCH PATCH: 7 PATCH, EXTENDED RELEASE TRANSDERMAL at 09:00

## 2018-01-30 RX ADMIN — IPRATROPIUM BROMIDE AND ALBUTEROL SULFATE SCH ML: .5; 3 SOLUTION RESPIRATORY (INHALATION) at 18:54

## 2018-01-30 RX ADMIN — IPRATROPIUM BROMIDE AND ALBUTEROL SULFATE SCH ML: .5; 3 SOLUTION RESPIRATORY (INHALATION) at 15:24

## 2018-01-30 RX ADMIN — MORPHINE SULFATE PRN MG: 2 INJECTION, SOLUTION INTRAMUSCULAR; INTRAVENOUS at 01:17

## 2018-01-30 RX ADMIN — ONDANSETRON PRN MG: 2 INJECTION INTRAMUSCULAR; INTRAVENOUS at 07:39

## 2018-01-30 RX ADMIN — IPRATROPIUM BROMIDE AND ALBUTEROL SULFATE SCH ML: .5; 3 SOLUTION RESPIRATORY (INHALATION) at 04:00

## 2018-01-30 RX ADMIN — IPRATROPIUM BROMIDE AND ALBUTEROL SULFATE SCH ML: .5; 3 SOLUTION RESPIRATORY (INHALATION) at 07:35

## 2018-01-30 RX ADMIN — IPRATROPIUM BROMIDE AND ALBUTEROL SULFATE SCH ML: .5; 3 SOLUTION RESPIRATORY (INHALATION) at 11:12

## 2018-01-30 RX ADMIN — IPRATROPIUM BROMIDE AND ALBUTEROL SULFATE SCH ML: .5; 3 SOLUTION RESPIRATORY (INHALATION) at 23:36

## 2018-01-30 RX ADMIN — METHYLPREDNISOLONE SODIUM SUCCINATE SCH MLS/MIN: 1 INJECTION, POWDER, FOR SOLUTION INTRAMUSCULAR; INTRAVENOUS at 20:03

## 2018-01-30 RX ADMIN — OSELTAMIVIR PHOSPHATE SCH MG: 75 CAPSULE ORAL at 20:04

## 2018-01-30 RX ADMIN — ONDANSETRON PRN MG: 2 INJECTION INTRAMUSCULAR; INTRAVENOUS at 01:17

## 2018-01-30 RX ADMIN — MONTELUKAST SODIUM SCH MG: 10 TABLET, FILM COATED ORAL at 20:04

## 2018-01-30 RX ADMIN — METHYLPREDNISOLONE SODIUM SUCCINATE SCH MLS/MIN: 1 INJECTION, POWDER, FOR SOLUTION INTRAMUSCULAR; INTRAVENOUS at 07:34

## 2018-01-30 RX ADMIN — MORPHINE SULFATE PRN MG: 2 INJECTION, SOLUTION INTRAMUSCULAR; INTRAVENOUS at 07:34

## 2018-01-30 RX ADMIN — GUAIFENESIN AND CODEINE PHOSPHATE SCH ML: 10; 100 LIQUID ORAL at 23:18

## 2018-01-30 RX ADMIN — OSELTAMIVIR PHOSPHATE SCH MG: 75 CAPSULE ORAL at 09:06

## 2018-01-30 RX ADMIN — MORPHINE SULFATE PRN MG: 2 INJECTION, SOLUTION INTRAMUSCULAR; INTRAVENOUS at 17:02

## 2018-01-30 RX ADMIN — SODIUM CHLORIDE AND POTASSIUM CHLORIDE SCH MLS/HR: 9; 1.49 INJECTION, SOLUTION INTRAVENOUS at 03:22

## 2018-01-30 RX ADMIN — METHYLPREDNISOLONE SODIUM SUCCINATE SCH MLS/MIN: 1 INJECTION, POWDER, FOR SOLUTION INTRAMUSCULAR; INTRAVENOUS at 14:10

## 2018-01-30 RX ADMIN — ENOXAPARIN SODIUM SCH MG: 40 INJECTION SUBCUTANEOUS at 20:04

## 2018-01-30 RX ADMIN — METHYLPREDNISOLONE SODIUM SUCCINATE SCH MLS/MIN: 1 INJECTION, POWDER, FOR SOLUTION INTRAMUSCULAR; INTRAVENOUS at 02:36

## 2018-01-30 NOTE — HOSPITALIST PROGRESS NOTE
Hospitalist Progress Note


Date of Service


Jan 30, 2018.





Subjective


Pt evaluation today including:  conversation w/ patient


Pain:  None reported


PO Intake:  Regular diet started this morning


Attending: Dr. Elaine





This is a 27-year-old  female admitted yesterday for asthma 

exacerbation. She found to be positive for influenza A. She had diffuse 

bronchospasm, hypoxia, noted respiratory distress and was admitted to the 

intensive care unit for further evaluation treatment. She was on Solu-Medrol as 

well as ceftriaxone in the emergency department. After the rapid antigen was 

positive for influenza A patient was changed to droplet precautions and started 

on Tamiflu 75 mg by mouth twice a day. Antibiotics were stopped. Patient was 

also supportively placed on BiPAP for SOB. She had a CXR completed with a 

questionable opacity at the left hilum vs vascular congestion. She has had no 

fever or chills. No rigors. She denies chest pr back pain. No hematuria. No 

hemoptysis, hematemesis, melena, hematochezia.





   Constitutional:  No fever


   Respiratory:  + wheezing, + shortness of breath, No cough, No sputum, No 

hemoptysis


   Cardiovascular:  No chest pain, No edema, No palpitations


   Abdomen:  No pain, No nausea, No vomiting, No diarrhea


   Musculoskeletal:  No joint pain, No muscle pain


   Heme:  No abnormal bleeding/bruising


   Skin:  No rash, No itch, No new/changing skin lesions, No color change


   All Other Systems:  Reviewed and Negative





Medications





Current Inpatient Medications








 Medications


  (Trade)  Dose


 Ordered  Sig/Yanet


 Route  Start Time


 Stop Time Status Last Admin


Dose Admin


 


 Enoxaparin Sodium


  (Lovenox Inj)  40 mg  Q24H


 SQ  1/29/18 21:00


 2/28/18 20:59   


 


 


 Acetaminophen


  (Tylenol Tab)  650 mg  Q4H  PRN


 PO  1/29/18 11:30


 2/28/18 11:29   


 


 


 Al Hydrox/Mg


 Hydrox/Simethicone


  (Maalox Max Susp)  15 ml  Q4H  PRN


 PO  1/29/18 11:30


 2/28/18 11:29   


 


 


 Magnesium


 Hydroxide


  (Milk Of


 Magnesia Susp)  30 ml  Q12H  PRN


 PO  1/29/18 11:30


 2/28/18 11:29   


 


 


 Ondansetron HCl


  (Zofran Inj)  4 mg  Q6H  PRN


 IV  1/29/18 11:30


 2/28/18 11:29  1/30/18 07:39


4 MG


 


 Ranitidine HCl


  (zANTac TAB)  150 mg  BID


 PO  1/29/18 21:00


 2/28/18 20:59  1/30/18 09:06


150 MG


 


 Morphine Sulfate


  (MoRPHine


 SULFATE INJ)  2 mg  Q2H  PRN


 IV  1/29/18 11:30


 2/12/18 11:29  1/30/18 07:34


2 MG


 


 Albuterol/


 Ipratropium


  (Duoneb)  3 ml  Q4R


 INH  1/29/18 12:00


 2/28/18 11:59  1/30/18 11:12


3 ML


 


 Miscellaneous


 Information


  (Icu Protocol


 For Hyperglycemia)  1 ea  PRN  PRN


 N/A  1/29/18 11:30


 1/31/18 11:29   


 


 


 Montelukast Sodium


  (Singulair Tab)  10 mg  HS


 PO  1/29/18 21:00


 2/28/18 20:59  1/29/18 19:50


10 MG


 


 Budesonide/


 Formoterol


 Fumarate


  (Symbicort 160/


 4.5 Inh)  2 puffs  BID


 INH  1/29/18 21:00


 2/28/18 20:59  1/30/18 09:06


2 PUFFS


 


 Oseltamivir


 Phosphate


  (Tamiflu Cap)  75 mg  BID


 PO  1/29/18 21:00


 2/3/18 20:59  1/30/18 09:06


75 MG


 


 Nicotine


  (Nicoderm Cq


 14MG Patch)  1 patch  QAM


 TD  1/29/18 13:00


 2/28/18 12:59   


 


 


 Miscellaneous


  (Remove Nicoderm


 Patch)  1 ea  HS


 N/A  1/29/18 21:00


 2/28/18 20:59   


 


 


 Albuterol Sulfate


  (Ventolin 0.083%


 2.5MG/3ML Neb)  2.5 mg  Q2H  PRN


 INH  1/29/18 11:30


 2/28/18 11:29   


 


 


 Prednisone


  (PredniSONE TAB)  60 mg  Q24H


 PO  2/1/18 09:00


 3/3/18 08:59   


 


 


 Methylprednisolone


 Sodium Succinate


 40 mg/Syringe  0.64 ml @ 


 1.5 mls/min  Q12H


 IV  1/30/18 21:00


 1/31/18 20:59   


 











Objective


Vital Signs





Vital Signs - as noted below





Laboratory Data - as noted below





Physical Exam:


General - NAD


Eyes - No icterus, gaze conjugate


ENT - Mucosa moist, no lesions or candidiasis


Neck - Supple, No JVD


Lungs - No rales or rhonchi. Continues with diffuse bronchospasm. Improved 

aeration. 


Heart - Regular, tachycardic in the low 100s


Abdomen - Soft, NT, ND, BS present 


Extremities - No edema, pedal pulses intact 


Neuro - A&OX3














  Date Time  Temp Pulse Resp B/P (MAP) Pulse Ox O2 Delivery O2 Flow Rate FiO2


 


1/30/18 14:00 37.0 94 21 123/86 (98) 94 Nasal Cannula 2.0 


 


1/30/18 12:00 37.1 102 22 126/82 (97) 96 Nasal Cannula 2.0 


 


1/30/18 12:00      Nasal Cannula 2.0 


 


1/30/18 11:12  87 18  95 Nasal Cannula 2.0 


 


1/30/18 10:28 36.9 88 19 138/82 (100) 93 Nasal Cannula 2.0 


 


1/30/18 08:00      Nasal Cannula 2.0 


 


1/30/18 08:00      Nasal Cannula  





      BiPAP  


 


1/30/18 08:00 37.0 96 18 109/69 (82) 96 Nasal Cannula 2.0 


 


1/30/18 07:35  98 22  91 Nasal Cannula 2.0 


 


1/30/18 06:00  77 24 122/64 (83) 94 Nasal Cannula 2.0 


 


1/30/18 04:00 37.2 86 20 114/67 (83) 95 Nasal Cannula 2.0 


 


1/30/18 04:00  82 22  94 Nasal Cannula 2.0 


 


1/30/18 04:00     92 Nasal Cannula 2.0 


 


1/30/18 02:00  98 13 125/86 (99) 95 Nasal Cannula 2.0 


 


1/30/18 01:00  110 22  92 Nasal Cannula 2.0 


 


1/30/18 00:01 36.8 117 25 134/85 (101) 94 BiPAP  30


 


1/29/18 23:59     94 BiPAP  30


 


1/29/18 22:39 37.3 114 30 130/88 (102) 98 BiPAP  30


 


1/29/18 22:03  110 30  94 BiPAP/CPAP  30


 


1/29/18 22:00  113 29 134/98 (110) 93 BiPAP  30


 


1/29/18 21:01  106   94   30


 


1/29/18 21:00  106 32  94 BiPAP/CPAP  30


 


1/29/18 20:50 37.0 116 26 140/81 (100) 94 BiPAP  30


 


1/29/18 20:43  115 32 140/81 (100) 94 BiPAP  30


 


1/29/18 20:00      BiPAP  30


 


1/29/18 19:52  106 26 133/75 (94) 96 BiPAP  30


 


1/29/18 18:01  101 30 121/94 (103) 93 BiPAP  30


 


1/29/18 16:00 37.2       


 


1/29/18 16:00  107 26 110/69 (94) 94   


 


1/29/18 16:00      BiPAP  30


 


1/29/18 15:48  114   92   30


 


1/29/18 15:26  103 25 108/68 (85) 94   











Laboratory Results





Last 24 Hours








Test


  1/29/18


15:15 1/29/18


18:45 1/29/18


21:07 1/29/18


23:28


 


Urine Pregnancy Test NEG    


 


Urine Opiates Screen NEG    


 


Urine Methadone, Qualitative NEG    


 


Urine Barbiturates NEG    


 


Urine Phencyclidine (PCP)


Level NEG 


  


  


  


 


 


Ur


Amphetamine/Methamphetamine NEG 


  


  


  


 


 


MDMA (Ecstasy) Screen NEG    


 


Urine Benzodiazepines Screen NEG    


 


Urine Cocaine Metabolite NEG    


 


Urine Marijuana (THC) NEG    


 


Bedside Glucose  118 mg/dl   121 mg/dl 


 


Sodium Level   139 mmol/L  


 


Potassium Level   4.4 mmol/L  


 


Chloride Level   109 mmol/L  


 


Carbon Dioxide Level   20 mmol/L  


 


Anion Gap   10.0 mmol/L  


 


Blood Urea Nitrogen   9 mg/dl  


 


Creatinine   0.88 mg/dl  


 


Est Creatinine Clear Calc


Drug Dose 


  


  92.6 ml/min 


  


 


 


Estimated GFR (


American) 


  


  105.1 


  


 


 


Estimated GFR (Non-


American 


  


  90.7 


  


 


 


BUN/Creatinine Ratio   9.8  


 


Random Glucose   156 mg/dl  


 


Calcium Level   8.5 mg/dl  


 


Phosphorus Level   3.8 mg/dl  


 


Magnesium Level   2.3 mg/dl  


 


Test


  1/30/18


01:00 1/30/18


05:09 1/30/18


05:53 1/30/18


08:15


 


Blood Gas Sample Site L Radial    


 


Bedside Blood Gas pH (LAB) 7.39    


 


Bedside Blood Gas pCO2 (LAB) 32 mmHg    


 


Bedside Blood Gas pO2 (LAB) 52 mmHg    


 


Bedside Blood Gas HCO3 (LAB) 20 meq/L    


 


Bedside Blood Gas Total CO2 21 mEq/l    


 


Bedside Blood Gas Base Excess


(LAB) -5.0 meq/L 


  


  


  


 


 


Bedside Blood Gas O2


Saturation 87.0 % 


  


  


  


 


 


Yuval Test Pass    


 


White Blood Count  10.21 K/uL   


 


Red Blood Count  4.45 M/uL   


 


Hemoglobin  14.7 g/dL   


 


Hematocrit  42.5 %   


 


Mean Corpuscular Volume  95.5 fL   


 


Mean Corpuscular Hemoglobin  33.0 pg   


 


Mean Corpuscular Hemoglobin


Concent 


  34.6 g/dl 


  


  


 


 


Platelet Count  148 K/uL   


 


Mean Platelet Volume  9.9 fL   


 


Neutrophils (%) (Auto)  90.5 %   


 


Lymphocytes (%) (Auto)  5.5 %   


 


Monocytes (%) (Auto)  3.7 %   


 


Eosinophils (%) (Auto)  0.0 %   


 


Basophils (%) (Auto)  0.0 %   


 


Neutrophils # (Auto)  9.24 K/uL   


 


Lymphocytes # (Auto)  0.56 K/uL   


 


Monocytes # (Auto)  0.38 K/uL   


 


Eosinophils # (Auto)  0.00 K/uL   


 


Basophils # (Auto)  0.00 K/uL   


 


RDW Standard Deviation  44.7 fL   


 


RDW Coefficient of Variation  12.7 %   


 


Immature Granulocyte % (Auto)  0.3 %   


 


Immature Granulocyte # (Auto)  0.03 K/uL   


 


Sodium Level  139 mmol/L   


 


Potassium Level  4.3 mmol/L   


 


Chloride Level  110 mmol/L   


 


Carbon Dioxide Level  21 mmol/L   


 


Anion Gap  8.0 mmol/L   


 


Blood Urea Nitrogen  8 mg/dl   


 


Creatinine  0.67 mg/dl   


 


Est Creatinine Clear Calc


Drug Dose 


  121.6 ml/min 


  


  


 


 


Estimated GFR (


American) 


  140.6 


  


  


 


 


Estimated GFR (Non-


American 


  121.3 


  


  


 


 


BUN/Creatinine Ratio  12.3   


 


Random Glucose  121 mg/dl   


 


Lactic Acid Level  0.9 mmol/L   


 


Calcium Level  8.8 mg/dl   


 


Phosphorus Level  3.9 mg/dl   


 


Magnesium Level  2.2 mg/dl   


 


Bedside Glucose   114 mg/dl  132 mg/dl 











Assessment and Plan





(1) Influenza A


(2) Acute asthma exacerbation


(3) Respiratory distress


(4) Hypoxia


(5) Acute kidney injury


(6) Electrolyte abnormality


(7) Hypotension


INFLUENZA A PULMONARY


* Tamiflu started 1/29/2018 treat with 75 mg PO BID X 5 days


* Continue droplet precautions for 7 days


* Repeat CXR in the morning to survey for secondary bacterial infection





ASTHMA EXACERBATION


* Persistent asthma as an outpatient with recent admission in December. 

Continue with DuoNeb treatment and BiPAP when necessary


* Tobacco abuse - ongoing daily tobacco abuse with one half to one pack per 

day. NicoDerm transdermal patch ordered but refused. Discussed the need for 

abstinence with her history of asthma


* Follow-up outpatient pulmonary appointment missed in December and January. 

Encourage patient to follow-up with outpatient office for better control of her 

persistent severe asthma


* Patient admitted on Solu-Medrol 60 mg IV every 6 hours. Will receive three 

doses today then change to 40 mg IV every 12 hours. If patient continues to 

improve change to prednisone taper on Thursday or Friday





HYPOTENSION


* Resolved 


* Hold IV fluids as patient is now on a regular diet


* Hemodynamically stable


* Mild tachycardia with a rate in the low 100s most likely secondary to acute 

illness


* No chest pain or tightness





ACUTE KIDNEY INJURY


* Creatinine 1.15 on admission. Baseline of 0.89. Creatinine now 0.67


* Discontinue IV hydration


* Continue strict I's and O's





ELECTROLYTE ABNORMALITY


* Potassium 3.4 on admission - now 4.3


* Magnesium 1.7 on admission - now 2.2


* Follow serial labs and replete as necessary





ID


* Positive for influenza A - continue Tamiflu 75mg PO BID X 5 days


* Ceftriaxone received in the emergency department. No indication for abx now


* Chest x-ray with no clear infiltrate or consolidation


* Lactic acid 1.5


* WBC 7.13 on admission - now 10.21 but most likely secondary to high dose 

steroids


* Follow serial labs





GI


* No prior history of GERD


* No indication for PPI at this time. However, if patient requires intubation 

and then start PPI daily





TOBACCO ABUSE


* Discussed need for complete abstinence especially in light of her severe 

persistent asthma


* NicoDerm patch offered - patient refused





DVT PROPHYLAXIS


* Enoxaparin


* TEDs/SCDs





Please refer to Dr. Elaine's addendum for further recommendations.

## 2018-01-30 NOTE — CRITICAL CARE PROGRESS NOTE
Critical Care Progress Note


Date of Service


Jan 30, 2018.





ICU Day


ICU Day Number:  2





Attending


Dr. Fitzgerald





Subjective


Patient reports I feel "groggy".  Breathing improved, no shortness of breath


No overnight events





Objective


General: Alert and oriented


Respiratory: Speaking in full sentences on 2 L nasal cannula, normalized I to E 

ratio


Monitor: Normal sinus rhythm rate: 95


No focal neuro nor motor deficits





Assessment & Plan


PULMONARY


* Patient with influenza A -2/5 Tamiflu twice a day. Supportive care


* Asthma exacerbation - persistent asthma as an outpatient with recent 

admission in December. Continue with DuoNeb treatment and BiPAP when necessary


* Tobacco abuse - ongoing daily tobacco abuse with one half to one pack per 

day. NicoDerm transdermal patch.


* Follow-up outpatient pulmonary appointment and missed in December and 

January. Encouraged patient to follow-up with outpatient office for better 

control of her persistent severe asthma


* Discontinue ketamine, ketamine used for anxiety and bronchodilatation 

significant improvement overnight





CARDIOVASCULAR


* Hypotension: Resolved


* IV fluids: Discontinue


* Tachycardia: Improved


* No chest pain or tightness


* EKG sinus rhythm with short ID, T-wave inversions no longer present in 

inferior leads


* QTc 445





RENAL


* Acute kidney injury improved creatinine was 1.15 now 1.67








ELECTROLYTE ABNORMALITY


* Potassium 3.4 on admission improved to 4.3


* Magnesium 1.7 on admission improved to 2.2





ID


* Positive for influenza A - continue Tamiflu and supportive care


* Ceftriaxone received in the emergency department discontinued


* Mondor fever curve





ENDOCRINE


* No history of diabetes mellitus or hypothyroidism


* Follow BSG per ICU protocol





NEURO


* Alert and oriented X 3





IV ACCESS


* Peripheral IVs in place


* No indication for central line at this time





GI


* No prior history of GERD


* No indication for PPI at this time. However, if patient requires intubation 

and then start PPI daily








* No  complaints


* Patient making adequate urine at this time





DVT PROPHYLAXIS


* Enoxaparin


* TEDs/SCDs





ENDOCRINE


* Transition to the oral glucocorticoids and decreased dose today.


* Would do prolonged 2 week taper given severity of reactive airway disease





Data


Medications:





Current Inpatient Medications








 Medications


  (Trade)  Dose


 Ordered  Sig/Yanet


 Route  Start Time


 Stop Time Status Last Admin


Dose Admin


 


 Enoxaparin Sodium


  (Lovenox Inj)  40 mg  Q24H


 SQ  1/29/18 21:00


 2/28/18 20:59   


 


 


 Potassium


 Chloride/Sodium


 Chloride  1,000 ml @ 


 100 mls/hr  Q10H


 IV  1/29/18 13:00


 2/28/18 12:59  1/30/18 03:22


100 MLS/HR


 


 Acetaminophen


  (Tylenol Tab)  650 mg  Q4H  PRN


 PO  1/29/18 11:30


 2/28/18 11:29   


 


 


 Al Hydrox/Mg


 Hydrox/Simethicone


  (Maalox Max Susp)  15 ml  Q4H  PRN


 PO  1/29/18 11:30


 2/28/18 11:29   


 


 


 Magnesium


 Hydroxide


  (Milk Of


 Magnesia Susp)  30 ml  Q12H  PRN


 PO  1/29/18 11:30


 2/28/18 11:29   


 


 


 Ondansetron HCl


  (Zofran Inj)  4 mg  Q6H  PRN


 IV  1/29/18 11:30


 2/28/18 11:29  1/30/18 07:39


4 MG


 


 Ranitidine HCl


  (zANTac TAB)  150 mg  BID


 PO  1/29/18 21:00


 2/28/18 20:59  1/30/18 09:06


150 MG


 


 Morphine Sulfate


  (MoRPHine


 SULFATE INJ)  2 mg  Q2H  PRN


 IV  1/29/18 11:30


 2/12/18 11:29  1/30/18 07:34


2 MG


 


 Albuterol/


 Ipratropium


  (Duoneb)  3 ml  Q4R


 INH  1/29/18 12:00


 2/28/18 11:59  1/30/18 07:35


3 ML


 


 Miscellaneous


 Information


  (Icu Protocol


 For Hyperglycemia)  1 ea  PRN  PRN


 N/A  1/29/18 11:30


 1/31/18 11:29   


 


 


 Montelukast Sodium


  (Singulair Tab)  10 mg  HS


 PO  1/29/18 21:00


 2/28/18 20:59  1/29/18 19:50


10 MG


 


 Budesonide/


 Formoterol


 Fumarate


  (Symbicort 160/


 4.5 Inh)  2 puffs  BID


 INH  1/29/18 21:00


 2/28/18 20:59  1/30/18 09:06


2 PUFFS


 


 Methylprednisolone


 Sodium Succinate


 60 mg/Syringe  0.96 ml @ 


 1.5 mls/min  Q6H


 IV  1/29/18 14:00


 2/28/18 13:59  1/30/18 07:34


1.5 MLS/MIN


 


 Oseltamivir


 Phosphate


  (Tamiflu Cap)  75 mg  BID


 PO  1/29/18 21:00


 2/3/18 20:59  1/30/18 09:06


75 MG


 


 Nicotine


  (Nicoderm Cq


 14MG Patch)  1 patch  QAM


 TD  1/29/18 13:00


 2/28/18 12:59   


 


 


 Miscellaneous


  (Remove Nicoderm


 Patch)  1 ea  HS


 N/A  1/29/18 21:00


 2/28/18 20:59   


 


 


 Albuterol Sulfate


  (Ventolin 0.083%


 2.5MG/3ML Neb)  2.5 mg  Q2H  PRN


 INH  1/29/18 11:30


 2/28/18 11:29   


 


 


 Ketamine HCl 500


 mg/Sodium Chloride  500 ml @ 0


 mls/hr  Q0M PRN


 IV  1/29/18 22:00


 2/28/18 21:59  1/29/18 23:02


72 MLS/HR








Vital Signs:











  Date Time  Temp Pulse Resp B/P (MAP) Pulse Ox O2 Delivery O2 Flow Rate FiO2


 


1/30/18 08:00      Nasal Cannula 2.0 


 


1/30/18 08:00 37.0 96 18 109/69 (82) 96 Nasal Cannula 2.0 


 


1/30/18 07:35  98 22  91 Nasal Cannula 2.0 


 


1/30/18 06:00  77 24 122/64 (83) 94 Nasal Cannula 2.0 


 


1/30/18 04:00 37.2 86 20 114/67 (83) 95 Nasal Cannula 2.0 


 


1/30/18 04:00  82 22  94 Nasal Cannula 2.0 


 


1/30/18 04:00     92 Nasal Cannula 2.0 


 


1/30/18 02:00  98 13 125/86 (99) 95 Nasal Cannula 2.0 


 


1/30/18 01:00  110 22  92 Nasal Cannula 2.0 


 


1/30/18 00:01 36.8 117 25 134/85 (101) 94 BiPAP  30


 


1/29/18 23:59     94 BiPAP  30


 


1/29/18 22:39 37.3 114 30 130/88 (102) 98 BiPAP  30


 


1/29/18 22:03  110 30  94 BiPAP/CPAP  30


 


1/29/18 22:00  113 29 134/98 (110) 93 BiPAP  30


 


1/29/18 21:01  106   94   30


 


1/29/18 21:00  106 32  94 BiPAP/CPAP  30


 


1/29/18 20:50 37.0 116 26 140/81 (100) 94 BiPAP  30


 


1/29/18 20:43  115 32 140/81 (100) 94 BiPAP  30


 


1/29/18 20:00      BiPAP  30


 


1/29/18 19:52  106 26 133/75 (94) 96 BiPAP  30


 


1/29/18 18:01  101 30 121/94 (103) 93 BiPAP  30


 


1/29/18 16:00 37.2       


 


1/29/18 16:00  107 26 110/69 (94) 94   


 


1/29/18 16:00      BiPAP  30


 


1/29/18 15:48  114   92   30


 


1/29/18 15:26  103 25 108/68 (85) 94   


 


1/29/18 15:02  113 30  92 BiPAP/CPAP  30


 


1/29/18 12:20 37.2 122 24 96/61 92 BiPAP  30


 


1/29/18 11:30  128 28 109/48 93   


 


1/29/18 11:16    91/45    


 


1/29/18 11:15  131 30 76/58 99   


 


1/29/18 11:11    96/46    


 


1/29/18 11:00  133 30 75/48 98   


 


1/29/18 10:45  137 26 105/50 97   


 


1/29/18 10:37    78/56    


 


1/29/18 10:30  141 32 90/47 99 BiPAP  50





    95/46    


 


1/29/18 10:05  140   94   50


 


1/29/18 10:05  140 28  94 BiPAP/CPAP  50


 


1/29/18 10:00     86 Room Air  


 


1/29/18 09:40  164      


 


1/29/18 09:37 38.3 166 40 114/58 94 Nebulizer 9.0 


 


1/29/18 09:37     94   








Laboratory Results:





Last 24 Hours








Test


  1/29/18


09:49 1/29/18


10:05 1/29/18


10:08 1/29/18


12:11


 


Influenza Type A Antigen


  POS for Influ


A 


  


  


 


 


Influenza Type B Antigen


  Neg for Influ


B 


  


  


 


 


White Blood Count  7.13 K/uL   


 


Red Blood Count  4.62 M/uL   


 


Hemoglobin  15.2 g/dL   


 


Hematocrit  44.1 %   


 


Mean Corpuscular Volume  95.5 fL   


 


Mean Corpuscular Hemoglobin  32.9 pg   


 


Mean Corpuscular Hemoglobin


Concent 


  34.5 g/dl 


  


  


 


 


Platelet Count  156 K/uL   


 


Mean Platelet Volume  9.9 fL   


 


Neutrophils (%) (Auto)  91.1 %   


 


Lymphocytes (%) (Auto)  4.9 %   


 


Monocytes (%) (Auto)  3.4 %   


 


Eosinophils (%) (Auto)  0.0 %   


 


Basophils (%) (Auto)  0.3 %   


 


Neutrophils # (Auto)  6.50 K/uL   


 


Lymphocytes # (Auto)  0.35 K/uL   


 


Monocytes # (Auto)  0.24 K/uL   


 


Eosinophils # (Auto)  0.00 K/uL   


 


Basophils # (Auto)  0.02 K/uL   


 


RDW Standard Deviation  43.9 fL   


 


RDW Coefficient of Variation  12.7 %   


 


Immature Granulocyte % (Auto)  0.3 %   


 


Immature Granulocyte # (Auto)  0.02 K/uL   


 


Sodium Level  136 mmol/L   


 


Potassium Level  3.4 mmol/L   


 


Chloride Level  105 mmol/L   


 


Carbon Dioxide Level  22 mmol/L   


 


Anion Gap  9.0 mmol/L   


 


Blood Urea Nitrogen  12 mg/dl   


 


Creatinine  1.15 mg/dl   


 


Est Creatinine Clear Calc


Drug Dose 


  72.6 ml/min 


  


  


 


 


Estimated GFR (


American) 


  76.0 


  


  


 


 


Estimated GFR (Non-


American 


  65.6 


  


  


 


 


BUN/Creatinine Ratio  10.4   


 


Random Glucose  123 mg/dl   


 


Calcium Level  8.5 mg/dl   


 


Magnesium Level  1.7 mg/dl   


 


Total Bilirubin  0.3 mg/dl   


 


Aspartate Amino Transf


(AST/SGOT) 


  19 U/L 


  


  


 


 


Alanine Aminotransferase


(ALT/SGPT) 


  19 U/L 


  


  


 


 


Alkaline Phosphatase  67 U/L   


 


Troponin I  < 0.015 ng/ml   


 


Total Protein  7.6 gm/dl   


 


Albumin  4.0 gm/dl   


 


Globulin  3.6 gm/dl   


 


Albumin/Globulin Ratio  1.1   


 


Lactic Acid Level   1.5 mmol/L  


 


Venous Blood pH    7.32 


 


Venous Blood Partial Pressure


CO2 


  


  


  39 mmHg 


 


 


Venous Blood Partial Pressure


O2 


  


  


  43 mmHg 


 


 


Venous Blood HCO3    20 mmol/L 


 


Venous Blood Oxygen Saturation    74.0 % 


 


Venous Blood Base Excess    -5.9 mEq/L 


 


Test


  1/29/18


15:15 1/29/18


18:45 1/29/18


21:07 1/29/18


23:28


 


Urine Pregnancy Test NEG    


 


Urine Opiates Screen NEG    


 


Urine Methadone, Qualitative NEG    


 


Urine Barbiturates NEG    


 


Urine Phencyclidine (PCP)


Level NEG 


  


  


  


 


 


Ur


Amphetamine/Methamphetamine NEG 


  


  


  


 


 


MDMA (Ecstasy) Screen NEG    


 


Urine Benzodiazepines Screen NEG    


 


Urine Cocaine Metabolite NEG    


 


Urine Marijuana (THC) NEG    


 


Bedside Glucose  118 mg/dl   121 mg/dl 


 


Sodium Level   139 mmol/L  


 


Potassium Level   4.4 mmol/L  


 


Chloride Level   109 mmol/L  


 


Carbon Dioxide Level   20 mmol/L  


 


Anion Gap   10.0 mmol/L  


 


Blood Urea Nitrogen   9 mg/dl  


 


Creatinine   0.88 mg/dl  


 


Est Creatinine Clear Calc


Drug Dose 


  


  92.6 ml/min 


  


 


 


Estimated GFR (


American) 


  


  105.1 


  


 


 


Estimated GFR (Non-


American 


  


  90.7 


  


 


 


BUN/Creatinine Ratio   9.8  


 


Random Glucose   156 mg/dl  


 


Calcium Level   8.5 mg/dl  


 


Phosphorus Level   3.8 mg/dl  


 


Magnesium Level   2.3 mg/dl  


 


Test


  1/30/18


01:00 1/30/18


05:09 1/30/18


05:53 1/30/18


08:15


 


Blood Gas Sample Site L Radial    


 


Bedside Blood Gas pH (LAB) 7.39    


 


Bedside Blood Gas pCO2 (LAB) 32 mmHg    


 


Bedside Blood Gas pO2 (LAB) 52 mmHg    


 


Bedside Blood Gas HCO3 (LAB) 20 meq/L    


 


Bedside Blood Gas Total CO2 21 mEq/l    


 


Bedside Blood Gas Base Excess


(LAB) -5.0 meq/L 


  


  


  


 


 


Bedside Blood Gas O2


Saturation 87.0 % 


  


  


  


 


 


Yuval Test Pass    


 


White Blood Count  10.21 K/uL   


 


Red Blood Count  4.45 M/uL   


 


Hemoglobin  14.7 g/dL   


 


Hematocrit  42.5 %   


 


Mean Corpuscular Volume  95.5 fL   


 


Mean Corpuscular Hemoglobin  33.0 pg   


 


Mean Corpuscular Hemoglobin


Concent 


  34.6 g/dl 


  


  


 


 


Platelet Count  148 K/uL   


 


Mean Platelet Volume  9.9 fL   


 


Neutrophils (%) (Auto)  90.5 %   


 


Lymphocytes (%) (Auto)  5.5 %   


 


Monocytes (%) (Auto)  3.7 %   


 


Eosinophils (%) (Auto)  0.0 %   


 


Basophils (%) (Auto)  0.0 %   


 


Neutrophils # (Auto)  9.24 K/uL   


 


Lymphocytes # (Auto)  0.56 K/uL   


 


Monocytes # (Auto)  0.38 K/uL   


 


Eosinophils # (Auto)  0.00 K/uL   


 


Basophils # (Auto)  0.00 K/uL   


 


RDW Standard Deviation  44.7 fL   


 


RDW Coefficient of Variation  12.7 %   


 


Immature Granulocyte % (Auto)  0.3 %   


 


Immature Granulocyte # (Auto)  0.03 K/uL   


 


Sodium Level  139 mmol/L   


 


Potassium Level  4.3 mmol/L   


 


Chloride Level  110 mmol/L   


 


Carbon Dioxide Level  21 mmol/L   


 


Anion Gap  8.0 mmol/L   


 


Blood Urea Nitrogen  8 mg/dl   


 


Creatinine  0.67 mg/dl   


 


Est Creatinine Clear Calc


Drug Dose 


  121.6 ml/min 


  


  


 


 


Estimated GFR (


American) 


  140.6 


  


  


 


 


Estimated GFR (Non-


American 


  121.3 


  


  


 


 


BUN/Creatinine Ratio  12.3   


 


Random Glucose  121 mg/dl   


 


Lactic Acid Level  0.9 mmol/L   


 


Calcium Level  8.8 mg/dl   


 


Phosphorus Level  3.9 mg/dl   


 


Magnesium Level  2.2 mg/dl   


 


Bedside Glucose   114 mg/dl  132 mg/dl

## 2018-01-31 VITALS — HEART RATE: 84 BPM | OXYGEN SATURATION: 90 %

## 2018-01-31 VITALS
OXYGEN SATURATION: 94 % | TEMPERATURE: 99.14 F | HEART RATE: 98 BPM | DIASTOLIC BLOOD PRESSURE: 74 MMHG | SYSTOLIC BLOOD PRESSURE: 129 MMHG

## 2018-01-31 VITALS
HEART RATE: 83 BPM | SYSTOLIC BLOOD PRESSURE: 103 MMHG | TEMPERATURE: 98.24 F | OXYGEN SATURATION: 96 % | DIASTOLIC BLOOD PRESSURE: 59 MMHG

## 2018-01-31 VITALS
OXYGEN SATURATION: 94 % | DIASTOLIC BLOOD PRESSURE: 60 MMHG | SYSTOLIC BLOOD PRESSURE: 103 MMHG | HEART RATE: 78 BPM | TEMPERATURE: 98.24 F

## 2018-01-31 VITALS
SYSTOLIC BLOOD PRESSURE: 118 MMHG | TEMPERATURE: 97.88 F | HEART RATE: 84 BPM | DIASTOLIC BLOOD PRESSURE: 46 MMHG | OXYGEN SATURATION: 93 %

## 2018-01-31 VITALS
DIASTOLIC BLOOD PRESSURE: 58 MMHG | SYSTOLIC BLOOD PRESSURE: 90 MMHG | HEART RATE: 80 BPM | OXYGEN SATURATION: 95 % | TEMPERATURE: 97.88 F

## 2018-01-31 VITALS
TEMPERATURE: 98.24 F | OXYGEN SATURATION: 93 % | HEART RATE: 99 BPM | DIASTOLIC BLOOD PRESSURE: 77 MMHG | SYSTOLIC BLOOD PRESSURE: 127 MMHG

## 2018-01-31 VITALS
TEMPERATURE: 98.24 F | OXYGEN SATURATION: 96 % | SYSTOLIC BLOOD PRESSURE: 103 MMHG | HEART RATE: 83 BPM | DIASTOLIC BLOOD PRESSURE: 59 MMHG

## 2018-01-31 VITALS — OXYGEN SATURATION: 95 % | HEART RATE: 80 BPM

## 2018-01-31 VITALS — OXYGEN SATURATION: 95 % | HEART RATE: 84 BPM

## 2018-01-31 VITALS — OXYGEN SATURATION: 95 %

## 2018-01-31 VITALS — OXYGEN SATURATION: 92 % | HEART RATE: 80 BPM

## 2018-01-31 VITALS — HEART RATE: 76 BPM | OXYGEN SATURATION: 95 %

## 2018-01-31 VITALS — HEART RATE: 74 BPM | OXYGEN SATURATION: 94 %

## 2018-01-31 VITALS — OXYGEN SATURATION: 95 % | HEART RATE: 92 BPM

## 2018-01-31 LAB
BUN SERPL-MCNC: 16 MG/DL (ref 7–18)
CALCIUM SERPL-MCNC: 8.9 MG/DL (ref 8.5–10.1)
CO2 SERPL-SCNC: 28 MMOL/L (ref 21–32)
CREAT SERPL-MCNC: 0.77 MG/DL (ref 0.6–1.2)
EOSINOPHIL NFR BLD AUTO: 197 K/UL (ref 130–400)
GLUCOSE SERPL-MCNC: 121 MG/DL (ref 70–99)
HCT VFR BLD CALC: 44.3 % (ref 37–47)
HGB BLD-MCNC: 15.1 G/DL (ref 12–16)
MCH RBC QN AUTO: 33 PG (ref 25–34)
MCHC RBC AUTO-ENTMCNC: 34.1 G/DL (ref 32–36)
MCV RBC AUTO: 96.9 FL (ref 80–100)
PHOSPHATE SERPL-MCNC: 3.5 MG/DL (ref 2.5–4.9)
PMV BLD AUTO: 10.3 FL (ref 7.4–10.4)
POTASSIUM SERPL-SCNC: 4.5 MMOL/L (ref 3.5–5.1)
RED CELL DISTRIBUTION WIDTH CV: 13 % (ref 11.5–14.5)
RED CELL DISTRIBUTION WIDTH SD: 45.9 FL (ref 36.4–46.3)
SODIUM SERPL-SCNC: 136 MMOL/L (ref 136–145)
WBC # BLD AUTO: 16.27 K/UL (ref 4.8–10.8)

## 2018-01-31 RX ADMIN — IPRATROPIUM BROMIDE AND ALBUTEROL SULFATE SCH ML: .5; 3 SOLUTION RESPIRATORY (INHALATION) at 19:24

## 2018-01-31 RX ADMIN — IPRATROPIUM BROMIDE AND ALBUTEROL SULFATE SCH ML: .5; 3 SOLUTION RESPIRATORY (INHALATION) at 15:17

## 2018-01-31 RX ADMIN — GUAIFENESIN AND CODEINE PHOSPHATE SCH ML: 10; 100 LIQUID ORAL at 16:00

## 2018-01-31 RX ADMIN — GUAIFENESIN AND CODEINE PHOSPHATE SCH ML: 10; 100 LIQUID ORAL at 09:09

## 2018-01-31 RX ADMIN — IPRATROPIUM BROMIDE AND ALBUTEROL SULFATE SCH ML: .5; 3 SOLUTION RESPIRATORY (INHALATION) at 11:12

## 2018-01-31 RX ADMIN — MORPHINE SULFATE PRN MG: 2 INJECTION, SOLUTION INTRAMUSCULAR; INTRAVENOUS at 00:46

## 2018-01-31 RX ADMIN — IPRATROPIUM BROMIDE AND ALBUTEROL SULFATE SCH ML: .5; 3 SOLUTION RESPIRATORY (INHALATION) at 03:05

## 2018-01-31 RX ADMIN — OSELTAMIVIR PHOSPHATE SCH MG: 75 CAPSULE ORAL at 19:53

## 2018-01-31 RX ADMIN — GUAIFENESIN AND CODEINE PHOSPHATE SCH ML: 10; 100 LIQUID ORAL at 19:53

## 2018-01-31 RX ADMIN — NICOTINE SCH PATCH: 7 PATCH, EXTENDED RELEASE TRANSDERMAL at 09:00

## 2018-01-31 RX ADMIN — OSELTAMIVIR PHOSPHATE SCH MG: 75 CAPSULE ORAL at 09:09

## 2018-01-31 RX ADMIN — METHYLPREDNISOLONE SODIUM SUCCINATE SCH MLS/MIN: 1 INJECTION, POWDER, FOR SOLUTION INTRAMUSCULAR; INTRAVENOUS at 09:09

## 2018-01-31 RX ADMIN — MONTELUKAST SODIUM SCH MG: 10 TABLET, FILM COATED ORAL at 22:07

## 2018-01-31 RX ADMIN — IPRATROPIUM BROMIDE AND ALBUTEROL SULFATE SCH ML: .5; 3 SOLUTION RESPIRATORY (INHALATION) at 07:10

## 2018-01-31 RX ADMIN — GUAIFENESIN AND CODEINE PHOSPHATE SCH ML: 10; 100 LIQUID ORAL at 13:21

## 2018-01-31 RX ADMIN — GUAIFENESIN AND CODEINE PHOSPHATE SCH ML: 10; 100 LIQUID ORAL at 04:00

## 2018-01-31 RX ADMIN — ENOXAPARIN SODIUM SCH MG: 40 INJECTION SUBCUTANEOUS at 21:00

## 2018-01-31 NOTE — HOSPITALIST PROGRESS NOTE
Hospitalist Progress Note


Date of Service


Jan 31, 2018.





Subjective


Pt evaluation today including:  conversation w/ patient, physical exam, chart 

review, lab review, review of studies


Pain:  None


PO Intake:  Good


Voiding:  no voiding problems


The patient was seen and examined this morning. Pt reports " I'm really tired" 

when I enter the room. Her lights are off, shades drawn.  She feels her 

breathing is significantly improved compared to yesterday.  She reports having 

an intermittent cough sometimes productive with yellow sputum.  She has been 

ambulating about the room without shortness of breath on 2 L via NC. 





The patient admits to smoking since age 14 ~ 1PPD.  She has a 3-year-old and 

admits to smoking in her home.  In the past the patient has tried NicoDerm 

patches, Chantix, and even hypnotherapy which have been unsuccessful.   I asked 

her to think about possible agents if she thought one works better than the 

other and to let me know tomorrow prior to her discharge so that I can provide 

her with the appropriate scripts.  





ROS: 


Constitutional: No fever, sweats or chills


Eyes: No diplopia, no worsening or blurred vision


ENT: normal hearing, no trouble swallowing


Respiratory: See history of present illness.


Cardiovascular: No chest pain, tightness or palpitations


Abdomen: No pain, nausea, vomiting, diarrhea or constipation


Musculoskeletal: No joint pain, calf pain, swelling


Neurologic: No weakness, numbness/tingling, or balance problems


Psychiatric: No anxiety or depression


Skin: No rash or itch





Objective


Vital Signs











  Date Time  Temp Pulse Resp B/P (MAP) Pulse Ox O2 Delivery O2 Flow Rate FiO2


 


1/31/18 06:00  76 18  95 Nasal Cannula 2.0 


 


1/31/18 04:00     94 Nasal Cannula 2.0 


 


1/31/18 04:00 36.8 78 17 103/60 (74) 94 Nasal Cannula 2.0 


 


1/31/18 03:05  84 16  90 Room Air  


 


1/31/18 02:00  80 16  92 Nasal Cannula 2.0 


 


1/31/18 00:01 37.3 98 20 129/74 (92) 94 Nasal Cannula 2.0 


 


1/30/18 23:59     94 Nasal Cannula 2.0 


 


1/30/18 23:37  93 16  91 Nasal Cannula 2.0 


 


1/30/18 22:00  87 20  95 Nasal Cannula 2.0 


 


1/30/18 20:00 36.8 104 24 92/50 (64) 97 Nasal Cannula 2.0 


 


1/30/18 20:00     97 Nasal Cannula 2.0 


 


1/30/18 18:57  94 18  94 Nasal Cannula 2.0 


 


1/30/18 18:00 36.9 102 20 110/72 (85) 93 Nasal Cannula 2.0 


 


1/30/18 16:00      Nasal Cannula 2.0 


 


1/30/18 16:00 36.9 91 21 104/50 (68) 95 Nasal Cannula 2.0 


 


1/30/18 15:24  85 22  95 Nasal Cannula 2.0 


 


1/30/18 14:00 37.0 94 21 123/86 (98) 94 Nasal Cannula 2.0 


 


1/30/18 12:00 37.1 102 22 126/82 (97) 96 Nasal Cannula 2.0 


 


1/30/18 12:00      Nasal Cannula 2.0 


 


1/30/18 11:12  87 18  95 Nasal Cannula 2.0 


 


1/30/18 10:28 36.9 88 19 138/82 (100) 93 Nasal Cannula 2.0 











Physical Exam


Notes:


General: awake, alert, no apparent distress, fatigued


Head: Normocephalic, atraumatic


ENT: PERRL, EOMI, no pharyngeal exudate, mucous membranes moist


Chest: On 2 L via NC, no respiratory distress, diminished aeration throughout, 

few expiratory wheeze, no rales or rhonchi.


Cardiac: Regular rate and rhythm, no murmur, no JVD, normal peripheral pulses, 

good capillary refill


Abdominal: NABS x 4 quadrants, soft, nontender to palpation, no rebound, 

guarding or tenderness


Extremities: Normal inspection, no peripheral edema or erythema, calfs 

nontender to palpation


Psych: Somewhat flat affect


Neuro: AAO x 3, speech is clear, no peripheral sensory deficits





Laboratory Results





Last 24 Hours








Test


  1/30/18


12:08 1/30/18


16:01 1/31/18


05:21 1/31/18


05:26


 


Bedside Glucose 129 mg/dl  150 mg/dl   124 mg/dl 


 


White Blood Count   16.27 K/uL  


 


Red Blood Count   4.57 M/uL  


 


Hemoglobin   15.1 g/dL  


 


Hematocrit   44.3 %  


 


Mean Corpuscular Volume   96.9 fL  


 


Mean Corpuscular Hemoglobin   33.0 pg  


 


Mean Corpuscular Hemoglobin


Concent 


  


  34.1 g/dl 


  


 


 


RDW Standard Deviation   45.9 fL  


 


RDW Coefficient of Variation   13.0 %  


 


Platelet Count   197 K/uL  


 


Mean Platelet Volume   10.3 fL  


 


Sodium Level   136 mmol/L  


 


Potassium Level   4.5 mmol/L  


 


Chloride Level   103 mmol/L  


 


Carbon Dioxide Level   28 mmol/L  


 


Anion Gap   5.0 mmol/L  


 


Blood Urea Nitrogen   16 mg/dl  


 


Creatinine   0.77 mg/dl  


 


Est Creatinine Clear Calc


Drug Dose 


  


  106.6 ml/min 


  


 


 


Estimated GFR (


American) 


  


  123.5 


  


 


 


Estimated GFR (Non-


American 


  


  106.6 


  


 


 


BUN/Creatinine Ratio   20.2  


 


Random Glucose   121 mg/dl  


 


Calcium Level   8.9 mg/dl  


 


Phosphorus Level   3.5 mg/dl  


 


Magnesium Level   2.1 mg/dl  











Assessment and Plan


This is a 26 yo F admitted for diffuse bronchospasm, asthma and influenza A 

infection, requiring ICU for hypotension and Bipap support initially. 





Acute respiratory failure secondary to Influenza A /Asthma Exacerbation/ 

Tobacco abuse


- Continue tamiflu, started on 1/29


- WBC elevated to 16K now, likely partially due to high dose steroids. 


   Consider repeating CXR to r/o secondary infection such as pneumonia if 

worsening respiratory status, however at this point its improved. - received 1 

dose ceftriaxone in the ER, no abx on at this time. 


- Duonebs, solumedrol 40 mg IV q12H - attempt to taper to PO prednisone, may 

require longer taper with underlying disease


- Smokes 1/2 ppd, pt refused nicotine patch. 


   Discussed cessation with the patient.:  As tried NicoDerm patch, Chantix, 

therapy in the past.  Will think about it and let me know tomorrow if she needs 

any for medication and attempt cessation.


- Follows with pulmonology as outpatient but has missed recent appointments - 

will set up at time of discharge. 





Hypotension:


- Resolved with IV fluids





SEVERO


- Cr. initially 1.15, now resolved. Cr 0.77.





Hypokalemia


Hypomagnesemia


- Resolved, follow prp, replace as needed. 





DVT ppx: lovenox subq, teds, scds





CODE : Full code





Disposition: Moved to [facility] from ICU.  From home, dc possibly tomorrow

## 2018-01-31 NOTE — CRITICAL CARE PROGRESS NOTE
Critical Care Progress Note


Date of Service


Jan 31, 2018.





Attending


Dr. Fitzgerald





Subjective


No acute events overnight. Oxygenated well on room air. Patient had removed 

nasal cannula. Patient also refusing DVT prophylaxis with enoxaparin. No chest 

pain or tightness. SOB improved. No fever or chills. No other acute complaints.





Objective





Vital Signs - as noted below





Laboratory Data - as noted below





Physical Exam:


General - NAD. Flat affect


Eyes - No icterus, gaze conjugate


ENT - Mucosa moist, no lesions or candidiasis


Neck - Supple, No JVD


Lungs - No rales or rhonchi. Continues with persistent bronchospasm. Better 

aeration


Heart - Regular, rate controlled. No longer tachycardic


Abdomen - Soft, NT, ND, BS present 


Extremities - No edema, pedal pulses intact 


Neuro - A&OX3








Assessment & Plan





(1) Influenza A


(2) Acute asthma exacerbation


(3) Respiratory distress


(4) Hypoxia


(5) Acute kidney injury


(6) Electrolyte abnormality


(7) Hypotension


INFLUEZA A


* Day # 3 Tamiflu


* Oxygenation now adequate omn room air


* Continue supportive care


* Finish all 10 doses of Tamiflu





ASTHMA EXACERBATION


* Secondary to poor control exacerbated by influenza


* Continue to taper steroids


* Continue bronchodilators


* Outpatient pulmonary followup





TOBACCO ABUSE


* Discussed need for abstinence


* NicoDerm transdermal patch is available but patient refusing





CARDIOVASCULAR


* No acute issues


* Hypotension resolved


* Hemodynamically stable


* No arrhythmias on telemetry





ID


* Influenza a being treated as above


* White count 16.27 most likely secondary to IV steroids. Afebrile


* MRSA screening negative


* Blood cultures 2 negative





ELECTROLYTES


* Balanced





RENAL


* Resolved SEVERO


* BUN 16


* Creatinine 0.77





ENDOCRINE


* Random glucose 121


* POC glucose 124


* Hyperglycosemia controlled and secondary to IV steroids





GI


* Continue ranitidine while on steroids








* No full catheter


* Follow Is&Os





IV ACCESS


* Peripheral IVs in place





DVT PROPHYLAXIS


* Patient refusing enoxaparin


* Ambulate as tolerated


* TEDs/SCDs





Thank you for including us in the care of this patient. We'll sign off at this 

time. Please feel free to reconsult as needed








I have personally evaluated and examined this patient.  I agree with assessment 

and plan of ALICIA Moran PA-C.





Patient feels improved today, was receiving a breathing treatment during my 

evaluation.  Improved aeration and air movement.  Encourage ambulation today





Consults & Procedures


Consultants:


Intensivist - Dr. Fitzgerald


Procedures:


Infusion of IV medications and IV fluids





Data


Medications:





Current Inpatient Medications








 Medications


  (Trade)  Dose


 Ordered  Sig/Yanet


 Route  Start Time


 Stop Time Status Last Admin


Dose Admin


 


 Enoxaparin Sodium


  (Lovenox Inj)  40 mg  Q24H


 SQ  1/29/18 21:00


 2/28/18 20:59   


 


 


 Acetaminophen


  (Tylenol Tab)  650 mg  Q4H  PRN


 PO  1/29/18 11:30


 2/28/18 11:29   


 


 


 Al Hydrox/Mg


 Hydrox/Simethicone


  (Maalox Max Susp)  15 ml  Q4H  PRN


 PO  1/29/18 11:30


 2/28/18 11:29   


 


 


 Magnesium


 Hydroxide


  (Milk Of


 Magnesia Susp)  30 ml  Q12H  PRN


 PO  1/29/18 11:30


 2/28/18 11:29   


 


 


 Ondansetron HCl


  (Zofran Inj)  4 mg  Q6H  PRN


 IV  1/29/18 11:30


 2/28/18 11:29  1/30/18 07:39


4 MG


 


 Morphine Sulfate


  (MoRPHine


 SULFATE INJ)  2 mg  Q2H  PRN


 IV  1/29/18 11:30


 2/12/18 11:29  1/31/18 00:46


2 MG


 


 Albuterol/


 Ipratropium


  (Duoneb)  3 ml  Q4R


 INH  1/29/18 12:00


 2/28/18 11:59  1/31/18 07:10


3 ML


 


 Miscellaneous


 Information


  (Icu Protocol


 For Hyperglycemia)  1 ea  PRN  PRN


 N/A  1/29/18 11:30


 1/31/18 11:29   


 


 


 Montelukast Sodium


  (Singulair Tab)  10 mg  HS


 PO  1/29/18 21:00


 2/28/18 20:59  1/30/18 20:04


10 MG


 


 Budesonide/


 Formoterol


 Fumarate


  (Symbicort 160/


 4.5 Inh)  2 puffs  BID


 INH  1/29/18 21:00


 2/28/18 20:59 Future Hold 1/30/18 09:06


2 PUFFS


 


 Oseltamivir


 Phosphate


  (Tamiflu Cap)  75 mg  BID


 PO  1/29/18 21:00


 2/3/18 20:59  1/31/18 09:09


75 MG


 


 Nicotine


  (Nicoderm Cq


 14MG Patch)  1 patch  QAM


 TD  1/29/18 13:00


 2/28/18 12:59   


 


 


 Miscellaneous


  (Remove Nicoderm


 Patch)  1 ea  HS


 N/A  1/29/18 21:00


 2/28/18 20:59  1/30/18 20:04


1 EA


 


 Albuterol Sulfate


  (Ventolin 0.083%


 2.5MG/3ML Neb)  2.5 mg  Q2H  PRN


 INH  1/29/18 11:30


 2/28/18 11:29   


 


 


 Prednisone


  (PredniSONE TAB)  60 mg  Q24H


 PO  2/1/18 09:00


 3/3/18 08:59   


 


 


 Methylprednisolone


 Sodium Succinate


 40 mg/Syringe  0.64 ml @ 


 1.5 mls/min  Q12H


 IV  1/30/18 21:00


 1/31/18 20:59  1/31/18 09:09


1.5 MLS/MIN


 


 Codeine Phosphate/


 Guaifenesin


  (Robitussin-AC


 Sugar Free Syrup)  5 ml  Q4


 PO  1/30/18 20:00


 3/1/18 19:59  1/31/18 09:09


5 ML








Vital Signs:











  Date Time  Temp Pulse Resp B/P (MAP) Pulse Ox O2 Delivery O2 Flow Rate FiO2


 


1/31/18 09:24 36.8 83 20  96  2.0 


 


1/31/18 08:00 36.8 83 20 103/59 (74) 96 Nasal Cannula 2.0 


 


1/31/18 08:00      Nasal Cannula 2.0 


 


1/31/18 07:10  74 16  94 Room Air  


 


1/31/18 06:00  76 18  95 Nasal Cannula 2.0 


 


1/31/18 04:00     94 Nasal Cannula 2.0 


 


1/31/18 04:00 36.8 78 17 103/60 (74) 94 Nasal Cannula 2.0 


 


1/31/18 03:05  84 16  90 Room Air  


 


1/31/18 02:00  80 16  92 Nasal Cannula 2.0 


 


1/31/18 00:01 37.3 98 20 129/74 (92) 94 Nasal Cannula 2.0 


 


1/30/18 23:59     94 Nasal Cannula 2.0 


 


1/30/18 23:37  93 16  91 Nasal Cannula 2.0 


 


1/30/18 22:00  87 20  95 Nasal Cannula 2.0 


 


1/30/18 20:00 36.8 104 24 92/50 (64) 97 Nasal Cannula 2.0 


 


1/30/18 20:00     97 Nasal Cannula 2.0 


 


1/30/18 18:57  94 18  94 Nasal Cannula 2.0 


 


1/30/18 18:00 36.9 102 20 110/72 (85) 93 Nasal Cannula 2.0 


 


1/30/18 16:00      Nasal Cannula 2.0 


 


1/30/18 16:00 36.9 91 21 104/50 (68) 95 Nasal Cannula 2.0 


 


1/30/18 15:24  85 22  95 Nasal Cannula 2.0 


 


1/30/18 14:00 37.0 94 21 123/86 (98) 94 Nasal Cannula 2.0 


 


1/30/18 12:00 37.1 102 22 126/82 (97) 96 Nasal Cannula 2.0 


 


1/30/18 12:00      Nasal Cannula 2.0 


 


1/30/18 11:12  87 18  95 Nasal Cannula 2.0 


 


1/30/18 10:28 36.9 88 19 138/82 (100) 93 Nasal Cannula 2.0 








Laboratory Results:





Last 24 Hours








Test


  1/30/18


12:08 1/30/18


16:01 1/31/18


05:21 1/31/18


05:26


 


Bedside Glucose 129 mg/dl  150 mg/dl   124 mg/dl 


 


White Blood Count   16.27 K/uL  


 


Red Blood Count   4.57 M/uL  


 


Hemoglobin   15.1 g/dL  


 


Hematocrit   44.3 %  


 


Mean Corpuscular Volume   96.9 fL  


 


Mean Corpuscular Hemoglobin   33.0 pg  


 


Mean Corpuscular Hemoglobin


Concent 


  


  34.1 g/dl 


  


 


 


RDW Standard Deviation   45.9 fL  


 


RDW Coefficient of Variation   13.0 %  


 


Platelet Count   197 K/uL  


 


Mean Platelet Volume   10.3 fL  


 


Sodium Level   136 mmol/L  


 


Potassium Level   4.5 mmol/L  


 


Chloride Level   103 mmol/L  


 


Carbon Dioxide Level   28 mmol/L  


 


Anion Gap   5.0 mmol/L  


 


Blood Urea Nitrogen   16 mg/dl  


 


Creatinine   0.77 mg/dl  


 


Est Creatinine Clear Calc


Drug Dose 


  


  106.6 ml/min 


  


 


 


Estimated GFR (


American) 


  


  123.5 


  


 


 


Estimated GFR (Non-


American 


  


  106.6 


  


 


 


BUN/Creatinine Ratio   20.2  


 


Random Glucose   121 mg/dl  


 


Calcium Level   8.9 mg/dl  


 


Phosphorus Level   3.5 mg/dl  


 


Magnesium Level   2.1 mg/dl

## 2018-02-01 VITALS
OXYGEN SATURATION: 92 % | HEART RATE: 100 BPM | TEMPERATURE: 97.88 F | DIASTOLIC BLOOD PRESSURE: 63 MMHG | SYSTOLIC BLOOD PRESSURE: 104 MMHG

## 2018-02-01 VITALS
OXYGEN SATURATION: 97 % | TEMPERATURE: 97.88 F | SYSTOLIC BLOOD PRESSURE: 104 MMHG | DIASTOLIC BLOOD PRESSURE: 63 MMHG | HEART RATE: 107 BPM

## 2018-02-01 VITALS — HEART RATE: 84 BPM | OXYGEN SATURATION: 93 %

## 2018-02-01 VITALS — OXYGEN SATURATION: 97 % | HEART RATE: 107 BPM

## 2018-02-01 VITALS — OXYGEN SATURATION: 92 % | HEART RATE: 100 BPM

## 2018-02-01 LAB
CREAT SERPL-MCNC: 0.77 MG/DL (ref 0.6–1.2)
EOSINOPHIL NFR BLD AUTO: 174 K/UL (ref 130–400)
HCT VFR BLD CALC: 45.3 % (ref 37–47)
HGB BLD-MCNC: 15.2 G/DL (ref 12–16)
MCH RBC QN AUTO: 32.9 PG (ref 25–34)
MCHC RBC AUTO-ENTMCNC: 33.6 G/DL (ref 32–36)
MCV RBC AUTO: 98.1 FL (ref 80–100)
PMV BLD AUTO: 10 FL (ref 7.4–10.4)
RED CELL DISTRIBUTION WIDTH CV: 13.1 % (ref 11.5–14.5)
RED CELL DISTRIBUTION WIDTH SD: 46.8 FL (ref 36.4–46.3)
WBC # BLD AUTO: 10.71 K/UL (ref 4.8–10.8)

## 2018-02-01 RX ADMIN — OSELTAMIVIR PHOSPHATE SCH MG: 75 CAPSULE ORAL at 07:52

## 2018-02-01 RX ADMIN — GUAIFENESIN AND CODEINE PHOSPHATE SCH ML: 10; 100 LIQUID ORAL at 08:02

## 2018-02-01 RX ADMIN — NICOTINE SCH PATCH: 7 PATCH, EXTENDED RELEASE TRANSDERMAL at 07:53

## 2018-02-01 RX ADMIN — IPRATROPIUM BROMIDE AND ALBUTEROL SULFATE SCH ML: .5; 3 SOLUTION RESPIRATORY (INHALATION) at 00:00

## 2018-02-01 RX ADMIN — IPRATROPIUM BROMIDE AND ALBUTEROL SULFATE SCH ML: .5; 3 SOLUTION RESPIRATORY (INHALATION) at 01:43

## 2018-02-01 RX ADMIN — GUAIFENESIN AND CODEINE PHOSPHATE SCH ML: 10; 100 LIQUID ORAL at 04:00

## 2018-02-01 RX ADMIN — MORPHINE SULFATE PRN MG: 2 INJECTION, SOLUTION INTRAMUSCULAR; INTRAVENOUS at 00:06

## 2018-02-01 RX ADMIN — GUAIFENESIN AND CODEINE PHOSPHATE SCH ML: 10; 100 LIQUID ORAL at 12:00

## 2018-02-01 RX ADMIN — IPRATROPIUM BROMIDE AND ALBUTEROL SULFATE SCH ML: .5; 3 SOLUTION RESPIRATORY (INHALATION) at 07:17

## 2018-02-01 RX ADMIN — GUAIFENESIN AND CODEINE PHOSPHATE SCH ML: 10; 100 LIQUID ORAL at 00:00

## 2018-02-01 RX ADMIN — IPRATROPIUM BROMIDE AND ALBUTEROL SULFATE SCH ML: .5; 3 SOLUTION RESPIRATORY (INHALATION) at 11:25

## 2018-02-01 NOTE — DISCHARGE SUMMARY
Discharge Summary


Date of Service


Feb 1, 2018.





Discharge Summary


Admission Date:


Jan 29, 2018 at 11:32


Discharge Date:  Feb 1, 2018


Discharge Disposition:  Home


Principal Diagnosis:  Acute respiratory failure due to influenza A, status 

asthmaticus


Problems/Secondary Diagnoses:


Severe persistent asthma


Influenza A


Bronchitis


Chronic tobacco smoking since age 14


Hypoxia - resolved


Immunizations:  


   Have You Had Influenza Vaccine:  Unknown


   History of Tetanus Vaccine?:  Unknown


   History of Pneumococcal:  Unknown


   History of Hepatitis B Vaccine:  Unknown


Procedures:


CHEST ONE VIEW PORTABLE 1/29/18


IMPRESSION:


1.  Prominence of the left hilum most likely vascular in etiology. No convincing


evidence of acute cardiopulmonary disease.


Consultations:


Intensivist





Medication Reconciliation


New Medications:  


Varenicline Tartrate (Chantix Starting Month Pa) 1 Horacio Horacio


0.5 MG PO UD for 28 Days, #1 PKT


0.5 mg once daily x 3 days, then 0.5 mg twice daily x 4 days. Then 1


 mg twice daily x 11 weeks.


Guaifenesin/Codeine (Robitussin-Ac Syrup)  Syrp


5 ML PO Q4 for 7 Days, #20 TSP





Oseltamivir Phosphate (Tamiflu) 75 Mg Cap


75 MG PO BID for 2 Days, #3 CAP


Take next dose tonight.  Finish 5 day course on 2/2


Prednisone (Prednisone) 20 Mg Tab


60 MG PO UD for 11 Days, #21 TAB


Take 60 mg (3 tab) x 3 days, then 40 mg (2 tab) x 4 days, then 20 mg


 (1 tab) x 4 days then stop. Finish on 2/12/18.


 


Continued Medications:  


Albuterol Hfa (Ventolin Hfa) 200 Puffs/36354 Mcg Aers


2 PUFFS INH Q6H, INHALER





Ipratropium-Albuterol (Duoneb) 3 Ml Nebu


1 TREATMENT INH Q4H, INHA





Mometasone Furoate-Formoterol (Dulera 100/5 Mcg) 1 Aer Aer


2 PUFFS INH BID for 30 Days, #1 INHALER 2 Refills





Montelukast Sodium (Singulair) 10 Mg Tab


1 TAB PO HS for 30 Days, #30 TAB 2 Refills











Discharge Exam


The patient was seen and examined this morning. Pt reports doing well today.  

She reports that her cough is ongoing, it's dry, and the cough syrup has been 

helping.  She denies any fevers, chills, sweats or any other acute complaints.





Discussion was held regarding smoking cessation again, and patient is agreeable 

at as long as her insurance will help cover the cost.  Chantix was found to 

cost only $3 per month, and the NicoDerm patch will only cause $1 per month - 

so she has elected to trial Chantix.  The patient has taken this before 2007 

and felt that it helped at that point.  Adverse reactions and side effects 

including suicidal and homicidal ideations were discussed, she is in agreement 

that if she has any alteration in mood we will contact her PCP.  If she were to 

develop suicidal or homicidal ideation she should stop the medication 

immediately and seek help at the ER. 





ROS: 


Constitutional: No fever, sweats or chills


Eyes: No diplopia, no worsening or blurred vision


ENT: normal hearing, no trouble swallowing


Respiratory: See history of present illness.


Cardiovascular: No chest pain, tightness or palpitations


Abdomen: No pain, nausea, vomiting, diarrhea or constipation


Musculoskeletal: No joint pain, calf pain, swelling


Neurologic: No weakness, numbness/tingling, or balance problems


Psychiatric: No anxiety or depression


Skin: No rash or itch





PE:


General: awake, alert, no apparent distress, fatigued


Head: Normocephalic, atraumatic


ENT: PERRL, EOMI, no pharyngeal exudate, mucous membranes moist


Chest: On room air, no respiratory distress, improved aeration throughout, 

faint expiratory wheeze, no rales or rhonchi.


Cardiac: Regular rate and rhythm, no murmur, no JVD, normal peripheral pulses, 

good capillary refill


Abdominal: NABS x 4 quadrants, soft, nontender to palpation, no rebound, 

guarding or tenderness


Extremities: Normal inspection, no peripheral edema or erythema, calfs 

nontender to palpation


Psych: Somewhat flat affect


Neuro: AAO x 3, speech is clear, no peripheral sensory deficits





Hospital Course


This is a 26 yo F admitted for diffuse bronchospasm, asthma and influenza A 

infection, requiring ICU for hypotension and Bipap support initially. 





Acute respiratory failure secondary to Influenza A /Asthma Exacerbation/ 

Tobacco abuse


- Continue tamiflu, started on 1/29 - continue x 3 more doses at time of 

discharge to complete 5 day course. 


- WBC decreased to 10K now, likely partially elevated at peak of 16K due to 

high dose steroids. - resolving.


 - received 1 dose ceftriaxone in the ER, no abx were continued


- Duonebs, solumedrol 40 mg IV q12H - pt has been tapered to PO prednisone 60 

mg x 3 more days, then 40 mg x 4 days, then 20 mg x 4 days then STOP. 


- Smokes 1/2- 1 ppd


   Discussed cessation with the patient:  Agreeable to trial of Chantix since 

the cost is only $3.00 per month.  Adverse reactions and side effects were 

discussed with the patient at bedside including suicidal and homicidal 

ideation.  If pt were to experience any of these side effects to call her PCP 

and go to the ER, and stop taking the medication immediately.


- Follows with pulmonology as outpatient - she has an appointment with Brian Pelaez PA-C within the next 1-2 weeks.  She has also been set up for follow-up 

with Dr. Quintero for allergy referral within the next 1-2 weeks.  She has missed 

multiple appointments with pulmonology and Dr. Quintero in the past.





Hypotension:


- Resolved with IV fluids





SEVERO


- Cr. initially 1.15, now resolved. Cr 0.77 on 2/1.





Hypokalemia


Hypomagnesemia


- Resolved, follow prp, replace as needed. 





DVT ppx: lovenox subq, teds, scds





CODE : Full code





Disposition: Moved to [facility] from ICU.  Discharge to home today.


Total Time Spent:  Greater than 30 minutes


This includes examination of the patient, discharge planning, medication 

reconciliation, and communication with other providers.





Discharge Instructions


Please refer to the electronic Patient Visit Report (Discharge Instructions) 

for additional information.





Follow-Up


Follow up with your Primary Care Provider within 1 week. 


Follow up with Pulmonology, Brian Pelaez within 1-2 weeks. 


Follow up with Allergy, Dr. Quintero within 1-2 weeks.

## 2018-02-01 NOTE — DISCHARGE INSTRUCTIONS
Discharge Instructions


Date of Service


Feb 1, 2018.





Admission


Reason for Admission:  Status Asthmaticus





Discharge


Discharge Diagnosis / Problem:  Status asthmaticus, influenza A, acute 

respiratory distress





Discharge Goals


Goal(s):  Decrease discomfort, Improve function, Increase independence, Improve 

disease control





Activity Recommendations


Activity Limitations:  resume your previous activity


Lifting Limitations:  none, gradually increase as tolerated


Exercise/Sports Limitations:  none, gradually increase as tolerated


May Resume Sexual Activity:  when tolerated


Shower/Bathe:  no limitations


Driving or Machine Use:  no limitations





.





Instructions / Follow-Up


Instructions / Follow-Up


You were admitted to [facility] with Acute respiratory distress and diagnosed with 

influenza A with severe persistent asthma flare. 


You initially required care in the Intensive care unit as you needed bipap 

breathing support.  


   During your stay here you were treated with supportive care including tamiflu

, breathing treatments, oxygen, and were encouraged to stop smoking.  


   You did not require oxygen at home. 


Imaging studies which were completed include CXR, and were abnormal showing 

involvement of influenza.








Medications: 


   Take prednisone taper as follows:


      Take 60 mg ( 3 tabs) x 3 days, then take 40 mg ( 2 tabs) x 4 days, then 

take 20 mg ( 1 tab) x 4 days.  Finish on 2/12/18





   Take Chantix as prescribed:


      Day 1-3: Take 0.5 mg once daily


      Day 4-7: Take 0.5 mg twice daily


      Week #2: Take 1 mg twice daily x 11 weeks.  


   ******Side effects and possible adverse effects include headache, nausea, 

gastrointestinal upset and possibility of mood alteration including but not 

limited to suicidal or homicidal ideations.  If you experience any of these 

symptoms contact your family physician and stop taking the medication 

immediately.  





   Take Robitussin/codeine cough syrup as directed for cough. Do not drive 

while taking this medication. You could be charged with a DUI if pulled over. 





   Continue taking your other medications as prescribed. 








Appointments:


Follow up with your Primary Care Provider within 1 week. 


Follow up with Pulmonology, Brian Pelaez PA-C as scheduled for you - this is for 

your asthma specifically


Follow up with Allergy, Dr. Quintero as scheduled for you 


   If any appointments do not suit your schedule, please call and cancel early 

to reschedule.





Current Hospital Diet


Patient's current hospital diet: Regular Diet





Discharge Diet


Recommended Diet:  Regular Diet





Pending Studies


Studies pending at discharge:  no





Medical Emergencies








.


Who to Call and When:





Medical Emergencies:  If at any time you feel your situation is an emergency, 

please call 911 immediately.





.





Non-Emergent Contact


Non-Emergency issues call your:  Primary Care Provider


Call Non-Emergent contact if:  you have a fever, temperature is above 100.5, 

your pain is not controlled, your pain is worsening, you have any medication 

questions


other concerns with your health.





Call 911 or go directly to the Emergency Department if you experience any of 

the following: Chest pain, chest tightness, shortness of breath, abdominal pain

, lightheadedness, dizziness, gastrointestinal bleeding, or have any other 

concerns regarding your health. 


.





Past History


Medical & Surgical History:  


(1) Influenza A


(2) Status asthmaticus


(3) Asthma attack


(4) Tachycardia


(5) Hypotension


.








"Provider Documentation" section prepared by Marci Gomez.








.





VTE Core Measure


Inpt VTE Proph given/why not?:  Enoxaparin (Lovenox)NEETA, T.E.D. Stockings, SCD's





[place] Drug Monitoring Program


Search Results:  no issues identified

## 2018-07-28 ENCOUNTER — HOSPITAL ENCOUNTER (EMERGENCY)
Dept: HOSPITAL 45 - C.EDB | Age: 28
LOS: 1 days | Discharge: HOME | End: 2018-07-29
Payer: COMMERCIAL

## 2018-07-28 VITALS — OXYGEN SATURATION: 96 %

## 2018-07-28 VITALS
BODY MASS INDEX: 30.66 KG/M2 | WEIGHT: 173.06 LBS | WEIGHT: 173.06 LBS | HEIGHT: 62.99 IN | HEIGHT: 62.99 IN | BODY MASS INDEX: 30.66 KG/M2

## 2018-07-28 VITALS — TEMPERATURE: 99.14 F

## 2018-07-28 DIAGNOSIS — Z3A.09: ICD-10-CM

## 2018-07-28 DIAGNOSIS — Z87.01: ICD-10-CM

## 2018-07-28 DIAGNOSIS — O99.511: Primary | ICD-10-CM

## 2018-07-28 DIAGNOSIS — J45.21: ICD-10-CM

## 2018-07-28 DIAGNOSIS — O99.331: ICD-10-CM

## 2018-07-28 DIAGNOSIS — Z71.6: ICD-10-CM

## 2018-07-28 DIAGNOSIS — Z87.09: ICD-10-CM

## 2018-07-28 DIAGNOSIS — F17.200: ICD-10-CM

## 2018-07-28 DIAGNOSIS — J18.0: ICD-10-CM

## 2018-07-28 LAB
ALBUMIN SERPL-MCNC: 3.8 GM/DL (ref 3.4–5)
ALP SERPL-CCNC: 67 U/L (ref 45–117)
ALT SERPL-CCNC: 21 U/L (ref 12–78)
AST SERPL-CCNC: 13 U/L (ref 15–37)
BASOPHILS # BLD: 0.04 K/UL (ref 0–0.2)
BASOPHILS NFR BLD: 0.4 %
BUN SERPL-MCNC: 9 MG/DL (ref 7–18)
CALCIUM SERPL-MCNC: 8.7 MG/DL (ref 8.5–10.1)
CO2 SERPL-SCNC: 19 MMOL/L (ref 21–32)
CREAT SERPL-MCNC: 0.79 MG/DL (ref 0.6–1.2)
EOS ABS #: 0.12 K/UL (ref 0–0.5)
EOSINOPHIL NFR BLD AUTO: 207 K/UL (ref 130–400)
GLUCOSE SERPL-MCNC: 94 MG/DL (ref 70–99)
HCT VFR BLD CALC: 40.2 % (ref 37–47)
HGB BLD-MCNC: 13.8 G/DL (ref 12–16)
IG#: 0.02 K/UL (ref 0–0.02)
IMM GRANULOCYTES NFR BLD AUTO: 10.8 %
LYMPHOCYTES # BLD: 1.17 K/UL (ref 1.2–3.4)
MCH RBC QN AUTO: 31.9 PG (ref 25–34)
MCHC RBC AUTO-ENTMCNC: 34.3 G/DL (ref 32–36)
MCV RBC AUTO: 93.1 FL (ref 80–100)
MONO ABS #: 0.73 K/UL (ref 0.11–0.59)
MONOCYTES NFR BLD: 6.7 %
NEUT ABS #: 8.77 K/UL (ref 1.4–6.5)
NEUTROPHILS # BLD AUTO: 1.1 %
NEUTROPHILS NFR BLD AUTO: 80.8 %
PMV BLD AUTO: 10 FL (ref 7.4–10.4)
POTASSIUM SERPL-SCNC: 3.6 MMOL/L (ref 3.5–5.1)
PROT SERPL-MCNC: 7.6 GM/DL (ref 6.4–8.2)
RED CELL DISTRIBUTION WIDTH CV: 12.1 % (ref 11.5–14.5)
RED CELL DISTRIBUTION WIDTH SD: 41.5 FL (ref 36.4–46.3)
SODIUM SERPL-SCNC: 136 MMOL/L (ref 136–145)
WBC # BLD AUTO: 10.85 K/UL (ref 4.8–10.8)

## 2018-07-28 NOTE — EMERGENCY ROOM VISIT NOTE
History


Report prepared by Vasu:  Liz Wilcox


Under the Supervision of:  Dr. Lisa Dos Santos D.O.


First contact with patient:  22:59


Chief Complaint:  PAIN (GENERALIZED)


Stated Complaint:  9WKS PREG,BREATHING,SHAKES,PAIN ALL OVER





History of Present Illness


The patient is a 27 year old female who presents to the Emergency Room with 

complaints of body aches and chills beginning this morning. She reports she has 

"severe body pains everywhere," a runny nose, and SOB. She notes she had 

diarrhea last night and she vomited in the past couple of days however she 

believes this is due to her pregnancy. The patient thinks she has a fever but 

did not take her temperature at home. 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 

but she has not been around anyone else who is sick. She is currently 9 weeks 

pregnant and this is her second pregnancy.  She has a past history of asthma 

but denies any other medical problems. She notes she had the flu in January but 

this does not feel similar. She states she is a former smoker but notes she 

last smoked this morning.





   Source of History:  patient


   Onset:  this morning


   Position:  other (global)


   Symptom Intensity:  severe


   Associated Symptoms:  + fevers (subjective), + chills, + SOB, + diarrhea


Note:


Positive runny nose.





Review of Systems


See HPI for pertinent positives & negatives. A total of 10 systems reviewed and 

were otherwise negative.





Past Medical & Surgical


Medical Problems:


(1) Acute kidney injury


(2) Acute respiratory failure


(3) Ankle pain


(4) Asthma


(5) Asthma attack


(6) Electrolyte abnormality


(7) Headache


(8) Hypoxia


(9) Infected sebaceous cyst


(10) IUGR (intrauterine growth restriction) affecting care of mother


(11) PNA, asthma exac, tachy cardia


(12) Pregnancy


(13) Pregnancy


(14) Pregnancy


(15) Pregnancy


(16) Pregnancy, supervision, high-risk


(17) Status asthmaticus


Surgical Problems:


(1) Hx of tonsillectomy


(2) [place] teeth extracted








Family History





FH: pulmonary embolism





Social History


Smoking Status:  Former Smoker


Alcohol Use:  none


Drug Use:  none


Marital Status:  in relationship


Housing Status:  lives with friends


Occupation Status:  employed





Current/Historical Medications


Scheduled


Calcium Carbonate-Vitamin D (Calcium 600 + D), 1 TAB PO DAILY


Multivit/Min/Iron/Fol Ac/Pren (Prenatal Vitamin), 1 TAB PO DAILY


Pyridoxine (Vitamin B6), 100 MG PO DAILY





Scheduled PRN


Albuterol Hfa (Ventolin Hfa), 2 PUFFS INH Q6H PRN for SOB/Wheezing


Albuterol Sulf (Proventil 0.083% 2.5MG/3ML), 2.5 MG INH QID PRN for SOB/Wheezing


Ipratropium-Albuterol (Duoneb), 1 TREATMENT INH Q4H PRN for SOB/Wheezing





Allergies


Coded Allergies:  


     No Known Allergies (Verified , 7/28/18)





Physical Exam


Vital Signs











  Date Time  Temp Pulse Resp B/P (MAP) Pulse Ox O2 Delivery O2 Flow Rate FiO2


 


7/29/18 00:44  110 18 102/50 94 Room Air  


 


7/29/18 00:03  116  105/55 95 Room Air  


 


7/28/18 23:15     96 Room Air  


 


7/28/18 22:32 37.3 122 18 117/72 91 Room Air  











Physical Exam


HEENT: Head - normocephalic and atraumatic   Pupils are equal, round, and 

reactive to light.  Extraocular eye muscles are intact, and sclera are 

anicteric.   Nose -  moist nasal mucosa without discharge. Mouth - moist buccal 

mucosa.  Oropharynx is nonerythematous and there is no tonsillar exudate or 

edema noted.


Neck: Supple; no JVD, nuchal rigidity, cervical lymphadenopathy.


Heart: Tachycardic rate and rhythm.  There is a normal S1 and S2 with no murmurs

, clicks, or gallops appreciated.


Lungs: Inspiratory and expiratory wheezing in all lung fields. 


Abdomen: Soft, completely nontender, nondistended, with good bowel sounds.  

There are no palpable pulsatile masses or hepatosplenomegaly.  There is no 

guarding, rigidity, or rebound noted.


Extremities: No evidence of cyanosis, clubbing, or edema.  There are easily 

palpable peripheral pulses.


Skin: warm and dry with good turgor and no rashes.





Medical Decision & Procedures


ER Provider


Diagnostic Interpretation:


Radiology results as stated below per my review and interpretation: 








CHEST X-RAY 





Left lower lobe infiltrate. No other obvious pulmonary consolidation





Laboratory Results


7/28/18 22:46








Red Blood Count 4.32, Mean Corpuscular Volume 93.1, Mean Corpuscular Hemoglobin 

31.9, Mean Corpuscular Hemoglobin Concent 34.3, Mean Platelet Volume 10.0, 

Neutrophils (%) (Auto) 80.8, Lymphocytes (%) (Auto) 10.8, Monocytes (%) (Auto) 

6.7, Eosinophils (%) (Auto) 1.1, Basophils (%) (Auto) 0.4, Neutrophils # (Auto) 

8.77, Lymphocytes # (Auto) 1.17, Monocytes # (Auto) 0.73, Eosinophils # (Auto) 

0.12, Basophils # (Auto) 0.04





7/28/18 22:46

















Test


  7/28/18


22:46 7/28/18


23:19 7/28/18


23:29


 


White Blood Count


  10.85 K/uL


(4.8-10.8) 


  


 


 


Red Blood Count


  4.32 M/uL


(4.2-5.4) 


  


 


 


Hemoglobin


  13.8 g/dL


(12.0-16.0) 


  


 


 


Hematocrit 40.2 % (37-47)   


 


Mean Corpuscular Volume


  93.1 fL


() 


  


 


 


Mean Corpuscular Hemoglobin


  31.9 pg


(25-34) 


  


 


 


Mean Corpuscular Hemoglobin


Concent 34.3 g/dl


(32-36) 


  


 


 


Platelet Count


  207 K/uL


(130-400) 


  


 


 


Mean Platelet Volume


  10.0 fL


(7.4-10.4) 


  


 


 


Neutrophils (%) (Auto) 80.8 %   


 


Lymphocytes (%) (Auto) 10.8 %   


 


Monocytes (%) (Auto) 6.7 %   


 


Eosinophils (%) (Auto) 1.1 %   


 


Basophils (%) (Auto) 0.4 %   


 


Neutrophils # (Auto)


  8.77 K/uL


(1.4-6.5) 


  


 


 


Lymphocytes # (Auto)


  1.17 K/uL


(1.2-3.4) 


  


 


 


Monocytes # (Auto)


  0.73 K/uL


(0.11-0.59) 


  


 


 


Eosinophils # (Auto)


  0.12 K/uL


(0-0.5) 


  


 


 


Basophils # (Auto)


  0.04 K/uL


(0-0.2) 


  


 


 


RDW Standard Deviation


  41.5 fL


(36.4-46.3) 


  


 


 


RDW Coefficient of Variation


  12.1 %


(11.5-14.5) 


  


 


 


Immature Granulocyte % (Auto) 0.2 %   


 


Immature Granulocyte # (Auto)


  0.02 K/uL


(0.00-0.02) 


  


 


 


Urine Color YELLOW   


 


Urine Appearance CLEAR (CLEAR)   


 


Urine pH 6.5 (4.5-7.5)   


 


Urine Specific Gravity


  1.020


(1.000-1.030) 


  


 


 


Urine Protein NEG (NEG)   


 


Urine Glucose (UA) NEG (NEG)   


 


Urine Ketones NEG (NEG)   


 


Urine Occult Blood NEG (NEG)   


 


Urine Nitrite NEG (NEG)   


 


Urine Bilirubin NEG (NEG)   


 


Urine Urobilinogen NEG (NEG)   


 


Urine Leukocyte Esterase NEG (NEG)   


 


Urine WBC (Auto) 1-5 /hpf (0-5)   


 


Urine RBC (Auto)


  5-10 /hpf


(0-4) 


  


 


 


Urine Hyaline Casts (Auto) 1-5 /lpf (0-5)   


 


Urine Epithelial Cells (Auto)


  10-20 /lpf


(0-5) 


  


 


 


Urine Bacteria (Auto) NEG (NEG)   


 


Anion Gap


  11.0 mmol/L


(3-11) 


  


 


 


Est Creatinine Clear Calc


Drug Dose 107.0 ml/min 


  


  


 


 


Estimated GFR (


American) 119.7 


  


  


 


 


Estimated GFR (Non-


American 103.3 


  


  


 


 


BUN/Creatinine Ratio 11.2 (10-20)   


 


Calcium Level


  8.7 mg/dl


(8.5-10.1) 


  


 


 


Total Bilirubin


  0.2 mg/dl


(0.2-1) 


  


 


 


Aspartate Amino Transf


(AST/SGOT) 13 U/L (15-37) 


  


  


 


 


Alanine Aminotransferase


(ALT/SGPT) 21 U/L (12-78) 


  


  


 


 


Alkaline Phosphatase


  67 U/L


() 


  


 


 


Total Protein


  7.6 gm/dl


(6.4-8.2) 


  


 


 


Albumin


  3.8 gm/dl


(3.4-5.0) 


  


 


 


Globulin


  3.8 gm/dl


(2.5-4.0) 


  


 


 


Albumin/Globulin Ratio 1.0 (0.9-2)   


 


Bedside Lactic Acid Venous


  


  1.81 mmol/L


(0.90-1.70) 


 


 


Influenza Type A Antigen


  


  


  Neg for Influ


A (NEG)


 


Influenza Type B Antigen


  


  


  Neg for Influ


B (NEG)





Laboratory results per my review.





Medications Administered











 Medications


  (Trade)  Dose


 Ordered  Sig/Yanet


 Route  Start Time


 Stop Time Status Last Admin


Dose Admin


 


 Acetaminophen


  (Tylenol Tab)  1,000 mg  NOW  STAT


 PO  7/28/18 23:08


 7/28/18 23:10 DC 7/28/18 23:24


1,000 MG


 


 Sodium Chloride  1,000 ml @ 


 999 mls/hr  Q1H1M STAT


 IV  7/28/18 23:09


 7/29/18 00:09 DC 7/28/18 23:15


999 MLS/HR


 


 Sodium Chloride  1,000 ml @ 


 250 mls/hr  Q4H STAT


 IV  7/28/18 23:09


 7/29/18 03:08  7/29/18 00:20


250 MLS/HR


 


 Albuterol/


 Ipratropium


  (Duoneb)  3 ml  NOW  STAT


 INH  7/28/18 23:09


 7/28/18 23:10 DC 7/28/18 23:24


3 ML


 


 Ceftriaxone Sodium


  (Rocephin Inj)  1 gm  NOW  STAT


 IV  7/29/18 00:09


 7/29/18 00:10 DC 7/29/18 00:20


1 GM











Procedure


Tylenol PO, NSS IV, Rocephin IV





ED Course


2302: The patient was evaluated in room C4. A complete history and physical 

examination were performed. Nursing notes and previous electronic medical 

records were reviewed.  IV lock was established and labs were drawn as above.  

A septic protocol was performed.





2308: Ordered Tylenol Tab 1000 mg PO.  The patient had a chest x-ray as 

described above.





2309: Ordered Duoneb 3 ml INH, Sodium Chloride 1000 ml @ 999 mls/hr IV





0000: I checked on the patient at this time. She feels slightly better.  Her 

lungs sound much more clear after receiving the nebulizer treatment.  I 

counseled her on smoking cessation. She states Zithromax does not work for her, 

so she will take Augmentin.





0009: Ordered Rocephin Inj 1 gm IV





0041: Upon reevaluation, the patient is feeling better. I discussed findings 

and results with her. She verbalized agreement of the treatment plan. She was 

discharged home.





Medical Decision


The patient is a 27 year old female who presents to the Emergency Room with 

complaints of body aches and chills beginning this morning. Differential 

diagnosis include influenza, dehydration, sepsis, pneumonia, bronchitis as well 

as others were entertained.





Lab results show: white count is 10.8 


80% neutrophils 


Lactic acid is 1.8 


Normal renal function 


Normal glucose 


Normal LFTs 


Normal urine and negative flu 





This is a 27-year-old female patient who is 9 weeks pregnant with twins 

presenting to the emergency department with cough, shortness of breath, wheezing

, diffuse body aches and chills.  Patient has a history of previous episodes of 

pneumonia and bronchitis.  Chest x-ray reveals a left lower lobe pneumonia.  O2 

saturations remained stable at greater than 91%.  A DuoNeb treatment gave her 

significant improvement in her respiratory status.  She does have nebulizer to 

use at home.  The patient did quit smoking earlier in the day.  I encouraged 

her to not smoke but especially during this pregnancy.  She will take a course 

of Augmentin.  I explained her that this would not cover atypical bacteria and 

that if she were to get any worse, she would need to return to the emergency 

department.  Otherwise, the patient should follow-up with her PCP on Monday.  

She will use her DuoNeb treatments at home every 4-6 hours.  I also explained 

that she may need to be prescribed steroids but this can be assessed at her 

follow-up on Monday.





Medication Reconcilliation


Current Medication List:  was personally reviewed by me





Blood Pressure Screening


Patient's blood pressure:  Normal blood pressure


Blood pressure disposition:  Did not require urgent referral





Impression





 Primary Impression:  


 Left lower lobe pneumonia


 Additional Impression:  


 Asthma exacerbation





Scribe Attestation


The scribe's documentation has been prepared under my direction and personally 

reviewed by me in its entirety. I confirm that the note above accurately 

reflects all work, treatment, procedures, and medical decision making performed 

by me.





Departure Information


Dispostion


Home / Self-Care





Referrals


Lakeisha Terry, C.R.N.P (PCP)





Forms


HOME CARE DOCUMENTATION FORM,                                                 

               IMPORTANT VISIT INFORMATION, WORK / SCHOOL INSTRUCTIONS





Patient Instructions


My [facility]





Additional Instructions





Rest





Take plenty of clear liquids





Augmentin - 1 tab. every 12 hours.





Return to the ER if you develop any worsening symptoms.





Follow up on Monday with PCP or OB for a recheck





Use your nebulizer every 4-6 hours over the next 3-4 days





STOP SMOKING!





Problem Qualifiers








 Primary Impression:  


 Left lower lobe pneumonia


 Pneumonia type:  due to unspecified organism  Qualified Codes:  J18.1 - Lobar 

pneumonia, unspecified organism


 Additional Impression:  


 Asthma exacerbation


 Asthma severity:  mild  Asthma persistence:  intermittent  Qualified Codes:  

J45.21 - Mild intermittent asthma with (acute) exacerbation

## 2018-07-29 ENCOUNTER — HOSPITAL ENCOUNTER (EMERGENCY)
Dept: HOSPITAL 45 - C.EDB | Age: 28
Discharge: HOME | End: 2018-07-29
Payer: COMMERCIAL

## 2018-07-29 VITALS — HEART RATE: 110 BPM | OXYGEN SATURATION: 94 % | SYSTOLIC BLOOD PRESSURE: 102 MMHG | DIASTOLIC BLOOD PRESSURE: 50 MMHG

## 2018-07-29 VITALS
BODY MASS INDEX: 30.08 KG/M2 | BODY MASS INDEX: 30.08 KG/M2 | HEIGHT: 62.99 IN | WEIGHT: 169.76 LBS | WEIGHT: 169.76 LBS | HEIGHT: 62.99 IN

## 2018-07-29 VITALS — HEART RATE: 127 BPM | DIASTOLIC BLOOD PRESSURE: 69 MMHG | SYSTOLIC BLOOD PRESSURE: 103 MMHG | OXYGEN SATURATION: 93 %

## 2018-07-29 VITALS — HEART RATE: 110 BPM | OXYGEN SATURATION: 96 %

## 2018-07-29 VITALS — TEMPERATURE: 99.14 F

## 2018-07-29 VITALS — OXYGEN SATURATION: 93 %

## 2018-07-29 DIAGNOSIS — Z87.891: ICD-10-CM

## 2018-07-29 DIAGNOSIS — O99.519: Primary | ICD-10-CM

## 2018-07-29 DIAGNOSIS — J18.9: ICD-10-CM

## 2018-07-29 DIAGNOSIS — J45.909: ICD-10-CM

## 2018-07-29 DIAGNOSIS — Z3A.00: ICD-10-CM

## 2018-07-29 DIAGNOSIS — Z79.52: ICD-10-CM

## 2018-07-29 LAB — INFLUENZA B ANTIGEN: (no result)

## 2018-07-29 NOTE — DIAGNOSTIC IMAGING REPORT
CHEST ONE VIEW PORTABLE



CLINICAL HISTORY: Sepsis dyspnea



COMPARISON STUDY:  20/6/2018



FINDINGS: Small parenchymal infiltrate left base. Lungs otherwise are clear.

Diaphragms smooth. 



IMPRESSION:  Small parenchymal infiltrate left base. 











The above report was generated using voice recognition software.  It may contain

grammatical, syntax or spelling errors.









Electronically signed by:  Santo Germain M.D.

7/29/2018 5:46 AM



Dictated Date/Time:  7/29/2018 5:45 AM

## 2018-07-29 NOTE — EMERGENCY ROOM VISIT NOTE
History


Report prepared by Vasu:  Keyana Rodgers


Under the Supervision of:  ADOLFO CottonO.


First contact with patient:  11:40


Chief Complaint:  SHORTNESS OF BREATH


Stated Complaint:  PNEUMONIA LEFT LUNG/CAN'T BE BREATH--9WK PREG


Nursing Triage Summary:  


Diagnosed with pneumonia left lung.  Wheezes bilaterally.  Cough.  SOB.  


Nebulizers not working at home.





History of Present Illness


The patient is a 27 year old female who presents to the Emergency Room with 

complaints of worsening shortness of breath since last night. She states that 

she was in the ED last night around 2230 and was diagnosed with pneumonia in 

her left lung and was placed on Levaquin. The patient reports she has 

difficulty breathing and she complains that her body hurts. The patient also 

reports coughing up yellow phlegm. The patient states she has a nebulizer at 

home, but that it is not helping to alleviate her symptoms. The patient is 

currently pregnant for the second time and states that she was on Prednisone 

during her first pregnancy. She reports a history of asthma.





   Source of History:  patient


   Onset:  last night


   Position:  chest


   Quality:  other (shortness of breath)


   Timing:  worsening


   Associated Symptoms:  + cough (coughing up yellow phlegm)


Note:


associated symptoms: body achnes





Review of Systems


See HPI for pertinent positives & negatives. A total of 10 systems reviewed and 

were otherwise negative.





Past Medical & Surgical


Medical Problems:


(1) Acute kidney injury


(2) Acute respiratory failure


(3) Ankle pain


(4) Asthma


(5) Asthma attack


(6) Bronchitis


(7) Electrolyte abnormality


(8) Headache


(9) Hypoxia


(10) Infected sebaceous cyst


(11) IUGR (intrauterine growth restriction) affecting care of mother


(12) PNA, asthma exac, tachy cardia


(13) Pregnancy


(14) Pregnancy


(15) Pregnancy


(16) Pregnancy


(17) Pregnancy, supervision, high-risk


(18) Status asthmaticus


Surgical Problems:


(1) Hx of tonsillectomy


(2) [place] teeth extracted








Family History





FH: pulmonary embolism





Social History


Smoking Status:  Former Smoker


Alcohol Use:  none


Drug Use:  none


Marital Status:  in relationship


Housing Status:  lives with friends


Occupation Status:  employed





Current/Historical Medications


Scheduled


Calcium Carbonate-Vitamin D (Calcium 600 + D), 1 TAB PO DAILY


Multivit/Min/Iron/Fol Ac/Pren (Prenatal Vitamin), 1 TAB PO DAILY


Prednisone (Prednisone), 2 TAB PO DAILY


Pyridoxine (Vitamin B6), 100 MG PO DAILY





Scheduled PRN


Albuterol Hfa (Ventolin Hfa), 2 PUFFS INH Q6H PRN for SOB/Wheezing


Albuterol Sulf (Proventil 0.083% 2.5MG/3ML), 2.5 MG INH QID PRN for SOB/Wheezing


Ipratropium-Albuterol (Duoneb), 1 TREATMENT INH Q4H PRN for SOB/Wheezing





Allergies


Coded Allergies:  


     No Known Allergies (Verified , 7/29/18)





Physical Exam


Vital Signs











  Date Time  Temp Pulse Resp B/P (MAP) Pulse Ox O2 Delivery O2 Flow Rate FiO2


 


7/29/18 13:15     93 Nasal Cannula 2.0 


 


7/29/18 13:14     89 Room Air  


 


7/29/18 12:56  129 30 150/80 96 Nebulizer  


 


7/29/18 12:27  119      


 


7/29/18 11:59  110 24  96 Room Air  


 


7/29/18 11:38      Room Air  


 


7/29/18 11:29 37.3  18 106/64 98 Room Air  











Physical Exam


CONSTITUTIONAL/VITAL SIGNS: Reviewed / noted above.


GENERAL: Non-toxic in appearance. 


INTEGUMENTARY: Warm, dry, and Pink.


HEAD: Normocephalic.


EYES: without scleral icterus or trauma.


ENT/OROPHARYNX: clear and moist.


LYMPHADENOPATHY/NECK: Is supple without lymphadenopathy or meningismus.


RESPIRATORY: Few scattered wheezes, bilaterally left lung bigger than right. 


CARDIOVASCULAR: Tachycardic rate and rhythm


GI/ABDOMEN: Soft and nontender. No organomegaly or pulsatile mass. No rebound 

or guarding. Normal bowel sounds.


EXTREMITIES: Warm and well perfused.


BACK: No CVA tenderness.


NEUROLOGICAL: Intact without focal deficits. 


PSYCHIATRIC: normal affect.


MUSCULOSKELETAL: Normally developed with good muscle tone. f





Medical Decision & Procedures


Medications Administered











 Medications


  (Trade)  Dose


 Ordered  Sig/Yanet


 Route  Start Time


 Stop Time Status Last Admin


Dose Admin


 


 Albuterol/


 Ipratropium


  (Duoneb)  12 ml  ONE  ONCE


 INH  7/29/18 12:00


 7/29/18 12:01 DC 7/29/18 11:59


12 ML


 


 Prednisone


  (PredniSONE TAB)  60 mg  NOW  STAT


 PO  7/29/18 11:48


 7/29/18 11:49 DC 7/29/18 11:53


60 MG











ED Course


1146: Previous medical records were reviewed. The patient was evaluated in room 

A11. A complete history and physical examination was performed.





1148: Ordered Prednisone 60 mg PO.





1200: Ordered Duoneb 12 ml INH.





1338: I reevaluated the patient and updated her on her results. The patient 

states she is feeling a little better.





1352: On reevaluation, the patient is resting. I discussed the results and 

findings with the patient. She verbalized agreement of the treatment plan. The 

patient was discharged home.





Medical Decision


The differential was considered includes acute myocardial infarction, acute 

coronary syndrome, myocarditis, pericarditis, pericardial effusions /tamponad, 

esophageal perforation,   pulmonary embolism, pneumonia, pneumothorax, 

cardiomyopathy, congestive heart, anemia , COPD/asthma exacerbation.





This is a 27-year-old female who presents to the ED with a chief complaint of 

shortness of breath.  The patient was seen here last night and diagnosed with a 

pneumonia.  Chest x-ray revealed a small left lower lobe pneumonia.  The 

patient does have a history of asthma.  She reports that her breathing seems to 

be getting worse.  She reports that her previous pregnancy, she was on 

prednisone throughout the pregnancy.  She reports that she had no problems with 

it previously.  The patient's vital signs here are normal.  Pulse ox was 93%.  

She appears slightly anxious but otherwise in no distress.  She has bilateral 

wheezes in both lung fields, left greater than right.  Exam was otherwise 

unremarkable.  The patient was given a 1 hour DuoNeb treatment.  After advising 

the patient that the prednisone is potentially harmful pregnancy, she 

understood the risks and reported that she taken it previously during 

pregnancy.  She accepts the risks and based on her symptoms and worsening, the 

benefit of prednisone likely outweighs the possibility of an adverse reaction 

to the fetus.  She was given prednisone 60 mg p.o. she was feeling somewhat 

better at the time of disposition discharge.  Her lungs were clear.





Medication Reconcilliation


Current Medication List:  was personally reviewed by me





Blood Pressure Screening


Patient's blood pressure:  Elevated blood pressure


Blood pressure disposition:  Elevated BP felt to be situational





Impression





 Primary Impression:  


 Pneumonia





Scribe Attestation


The scribe's documentation has been prepared under my direction and personally 

reviewed by me in its entirety. I confirm that the note above accurately 

reflects all work, treatment, procedures, and medical decision making performed 

by me.





Departure Information


Dispostion


Home / Self-Care





Prescriptions





Prednisone (Prednisone) 20 Mg Tab


2 TAB PO DAILY for 4 Days, #8 TAB


   Prov: Iftikhar Hu D.O.         7/29/18





Referrals


No Doctor, Assigned (PCP)





Forms


HOME CARE DOCUMENTATION FORM,                                                 

               IMPORTANT VISIT INFORMATION





Patient Instructions


My [facility]





Additional Instructions





Prednisone as prescribed.  Start taking this tomorrow.





Continue your nebulizers at home and the antibiotic that you were already 

prescribed.





Follow-up with your doctor for further care and evaluation in 1-2 days.  Return 

to the emergency department for worsening or new symptoms or any concerns.


You have been examined and treated today on an emergency basis only. This is 

not a substitute for, or an effort to provide, complete comprehensive medical 

care. It is impossible to recognize and treat all injuries or illnesses in a 

single emergency department visit. It is therefore important that you follow up 

closely with your doctor.  Call as soon as possible for an appointment.

## 2023-06-26 NOTE — EMERGENCY ROOM VISIT NOTE
History


Report prepared by Vasu:  Sheng Phillips


Under the Supervision of:  Dr. Ji Kat M.D.


First contact with patient:  07:17


Chief Complaint:  RESPIRATORY PROBLEMS


Stated Complaint:  CAN'T BREATHE


Nursing Triage Summary:  


c/o SOB and back and chest pain for 5-6 wks without relief





History of Present Illness


The patient is a 27 year old female with a history of asthma who presents to 

the Emergency Room with complaints of constant shortness of breath for the past 

five weeks. She notes not being able to catch her breath or breathe normally. 

She reports that her two year old daughter currently has pneumonia. She notes 

that her nebulizers are not working. She reports being off antibiotics for two 

weeks. She has used steroids successfully in the past for her asthma, but is 

not currently taking any steroids. She denies any fevers, chills, abdominal pain

, dysuria, leg pain or swelling. She denies a history of blood clots. She notes 

having normal menstrual periods and bowel movements.





   Source of History:  patient


   Onset:  5 weeks PTA


   Quality:  other (shortnes of breath)


   Timing:  constant


   Associated Symptoms:  + SOB, No fevers, No chills, No abdominal pain, No 

urinary symptoms (no dysuria)


Note:


She denies any leg pain or swelling.





Review of Systems


All systems have been listed, reviewed, and are negative other than those 

previously mentioned. Please see Additional Medical History Sheet.





Past Medical & Surgical


Medical Problems:


(1) Ankle pain


(2) Asthma


(3) Asthma attack


(4) Headache


(5) Hypoxia


(6) Infected sebaceous cyst


(7) IUGR (intrauterine growth restriction) affecting care of mother


(8) PNA, asthma exac, tachy cardia


(9) Pregnancy


(10) Pregnancy


(11) Pregnancy


(12) Pregnancy


(13) Pregnancy, supervision, high-risk


(14) Status asthmaticus


Surgical Problems:


(1) Hx of tonsillectomy


(2) [place] teeth extracted








Family History





FH: pulmonary embolism





Social History


Smoking Status:  Current Every Day Smoker (1/2 ppd)


Alcohol Use:  none


Drug Use:  none


Marital Status:  in relationship


Housing Status:  lives with friends


Occupation Status:  unemployed





Current/Historical Medications


Scheduled


Albuterol Hfa (Ventolin Hfa), 2 PUFFS INH Q6H


Ipratropium-Albuterol (Duoneb), 1 TREATMENT INH Q4H





Allergies


Coded Allergies:  


     No Known Allergies (Verified , 12/1/17)





Physical Exam


Vital Signs











  Date Time  Temp Pulse Resp B/P (MAP) Pulse Ox O2 Delivery O2 Flow Rate FiO2


 


12/1/17 11:23  111 20 116/82 91 Room Air  


 


12/1/17 09:59  112 20 105/77 92 Room Air  


 


12/1/17 08:55  108 20 101/79 95 Nebulizer  


 


12/1/17 07:58  99 16  99   


 


12/1/17 07:49  101 20 110/74 93   


 


12/1/17 07:44  103      


 


12/1/17 07:40     94 Room Air  


 


12/1/17 07:03 36.8 117 18 133/84 93   











Physical Exam


GENERAL: Patient awake, alert, oriented x 3.  Patient follows commands.  

Patient does not appear toxic.  Patient is adequately hydrated and well-

nourished.  


SKIN: No erythema, pallor, cyanosis or rash


HEENT: Normal head, pupils equal, reactive to light and accommodation.  Ears 

normal.  Oral cavity and posterior pharynx appear normal.  Neck: Without 

adenopathy, no neck vein distention.


LUNGS: Wheezes in all fields. Poor air movement. No rales, no rhonchi.


HEART:  No murmurs. No gallops. No rubs


ABDOMEN: No masses, no rebound, no hepatomegaly or splenomegaly.


EXTREMITIES: No signs of trauma.  No pedal or pretibial edema.  No calf or 

thigh tenderness.


NEUROLOGIC: Cranial nerves II-XII within normal limits.  No gross motor sensory 

function deficits.





Medical Decision & Procedures


ER Provider


Diagnostic Interpretation:


Radiology results as stated below per my review and radiologist interpretation:





CHEST 2 VIEWS ROUTINE





CLINICAL HISTORY: 27 years-old Female presenting with wheezing. 





TECHNIQUE: PA and lateral views of the chest were obtained.





COMPARISON: 11/3/2017.





FINDINGS:


Cardiomediastinal silhouette normal. Lungs and pleural spaces clear. Osseous


structures normal. Upper abdomen normal.





IMPRESSION:


1.  No acute cardiopulmonary disease.











Electronically signed by:  Reji Zee M.D.


12/1/2017 8:28 AM





Dictated Date/Time:  12/1/2017 8:27 AM





Laboratory Results


12/1/17 07:44








Red Blood Count 4.72, Mean Corpuscular Volume 95.3, Mean Corpuscular Hemoglobin 

32.8, Mean Corpuscular Hemoglobin Concent 34.4, Mean Platelet Volume 9.5, 

Neutrophils (%) (Auto) 59.3, Lymphocytes (%) (Auto) 24.3, Monocytes (%) (Auto) 

7.0, Eosinophils (%) (Auto) 8.5, Basophils (%) (Auto) 0.6, Neutrophils # (Auto) 

5.28, Lymphocytes # (Auto) 2.16, Monocytes # (Auto) 0.62, Eosinophils # (Auto) 

0.76, Basophils # (Auto) 0.05





12/1/17 07:44

















Test


  12/1/17


07:21 12/1/17


07:44


 


Influenza Type A Antigen


  Neg for Influ


A (NEG) 


 


 


Influenza Type B Antigen


  Neg for Influ


B (NEG) 


 


 


White Blood Count


  


  8.90 K/uL


(4.8-10.8)


 


Red Blood Count


  


  4.72 M/uL


(4.2-5.4)


 


Hemoglobin


  


  15.5 g/dL


(12.0-16.0)


 


Hematocrit  45.0 % (37-47) 


 


Mean Corpuscular Volume


  


  95.3 fL


()


 


Mean Corpuscular Hemoglobin


  


  32.8 pg


(25-34)


 


Mean Corpuscular Hemoglobin


Concent 


  34.4 g/dl


(32-36)


 


Platelet Count


  


  238 K/uL


(130-400)


 


Mean Platelet Volume


  


  9.5 fL


(7.4-10.4)


 


Neutrophils (%) (Auto)  59.3 % 


 


Lymphocytes (%) (Auto)  24.3 % 


 


Monocytes (%) (Auto)  7.0 % 


 


Eosinophils (%) (Auto)  8.5 % 


 


Basophils (%) (Auto)  0.6 % 


 


Neutrophils # (Auto)


  


  5.28 K/uL


(1.4-6.5)


 


Lymphocytes # (Auto)


  


  2.16 K/uL


(1.2-3.4)


 


Monocytes # (Auto)


  


  0.62 K/uL


(0.11-0.59)


 


Eosinophils # (Auto)


  


  0.76 K/uL


(0-0.5)


 


Basophils # (Auto)


  


  0.05 K/uL


(0-0.2)


 


RDW Standard Deviation


  


  42.8 fL


(36.4-46.3)


 


RDW Coefficient of Variation


  


  12.3 %


(11.5-14.5)


 


Immature Granulocyte % (Auto)  0.3 % 


 


Immature Granulocyte # (Auto)


  


  0.03 K/uL


(0.00-0.02)


 


Anion Gap


  


  9.0 mmol/L


(3-11)


 


Est Creatinine Clear Calc


Drug Dose 


  92.9 ml/min 


 


 


Estimated GFR (


American) 


  103.7 


 


 


Estimated GFR (Non-


American 


  89.4 


 


 


BUN/Creatinine Ratio  10.2 (10-20) 


 


Calcium Level


  


  9.0 mg/dl


(8.5-10.1)





Laboratory results as stated above per my review.





Medications Administered











 Medications


  (Trade)  Dose


 Ordered  Sig/Yanet


 Route  Start Time


 Stop Time Status Last Admin


Dose Admin


 


 Albuterol/


 Ipratropium


  (Duoneb)  12 ml  ONE  ONCE


 INH  12/1/17 07:30


 12/1/17 07:31 DC 12/1/17 07:58


12 ML


 


 Methylprednisolone


 Sodium Succinate


  (Solu-Medrol IV)  80 mg  NOW  STAT


 IV  12/1/17 07:20


 12/1/17 07:23 DC 12/1/17 07:48


80 MG











ECG


Indication:  SOB/dyspnea


Rate (beats per minute):  102


Rhythm:  sinus tachycardia


Findings:  no acute ischemic change, no ectopy





ED Course


0715: Past medical records reviewed. The patient was evaluated in room B3. A 

complete history and physical examination was performed. 





0720: Ordered Solu-Medrol 80 mg IV





0730: Ordered DuoNeb 12 mL INH 





1015: I reassessed the patient at this time. She is still wheezing but resting 

comfortably. 





1138: I reassessed the patient at this time. I discussed today's findings with 

her. She verbalized agreement of the treatment plan. I spoke with Dr. Castillo, 

hospitalist of the [facility] to evaluate the patient for 

further management.





Medical Decision


Nurses notes reviewed. Medical history sheet reviewed. Differential diagnosis 

includes but is not limited to: asthma, bronchitis, PNA, PE, and CHF.





On arrival the patient was wheezing and short of breath.  The patient was given 

an hour-long breathing treatment and IV steroids.  Chest x-ray was obtained but 

did not reveal any infiltrates.  White count was not elevated.  Despite the 

treatment the patient continued to wheeze and felt short of breath.  In light 

of her persistent wheezing I felt that she would benefit from further 

evaluation in the hospital.  I discussed care with the hospitalist.





Medication Reconcilliation


Current Medication List:  was personally reviewed by me





Blood Pressure Screening


Patient's blood pressure:  Normal blood pressure





Consults


Time Called:  1137


Consulting Physician:  Dr. Castillo hospitalist


Returned Call:  [phone]


I spoke with Dr. Castillo hospitalist regarding the patient's case. He recommends 

further management.





Impression





 Primary Impression:  


 Acute asthma exacerbation





Scribe Attestation


The scribe's documentation has been prepared under my direction and personally 

reviewed by me in its entirety. I confirm that the note above accurately 

reflects all work, treatment, procedures, and medical decision making performed 

by me.





Departure Information


Dispostion


Being Evaluated By Hospitalist





Referrals


No Doctor, Assigned (PCP)





Patient Instructions


My [facility] CBC/CMP/PT/PTT/INR/Type and Cross/Type and Screen/EKG